# Patient Record
Sex: FEMALE | Race: WHITE | Employment: OTHER | URBAN - METROPOLITAN AREA
[De-identification: names, ages, dates, MRNs, and addresses within clinical notes are randomized per-mention and may not be internally consistent; named-entity substitution may affect disease eponyms.]

---

## 2017-01-03 ENCOUNTER — ALLSCRIPTS OFFICE VISIT (OUTPATIENT)
Dept: OTHER | Facility: OTHER | Age: 62
End: 2017-01-03

## 2017-03-17 ENCOUNTER — GENERIC CONVERSION - ENCOUNTER (OUTPATIENT)
Dept: OTHER | Facility: OTHER | Age: 62
End: 2017-03-17

## 2017-03-20 ENCOUNTER — ALLSCRIPTS OFFICE VISIT (OUTPATIENT)
Dept: OTHER | Facility: OTHER | Age: 62
End: 2017-03-20

## 2017-04-04 ENCOUNTER — ALLSCRIPTS OFFICE VISIT (OUTPATIENT)
Dept: OTHER | Facility: OTHER | Age: 62
End: 2017-04-04

## 2017-04-12 ENCOUNTER — GENERIC CONVERSION - ENCOUNTER (OUTPATIENT)
Dept: OTHER | Facility: OTHER | Age: 62
End: 2017-04-12

## 2017-06-29 ENCOUNTER — GENERIC CONVERSION - ENCOUNTER (OUTPATIENT)
Dept: OTHER | Facility: OTHER | Age: 62
End: 2017-06-29

## 2017-07-25 ENCOUNTER — ALLSCRIPTS OFFICE VISIT (OUTPATIENT)
Dept: OTHER | Facility: OTHER | Age: 62
End: 2017-07-25

## 2017-08-08 ENCOUNTER — GENERIC CONVERSION - ENCOUNTER (OUTPATIENT)
Dept: OTHER | Facility: OTHER | Age: 62
End: 2017-08-08

## 2017-09-25 ENCOUNTER — APPOINTMENT (OUTPATIENT)
Dept: LAB | Facility: HOSPITAL | Age: 62
End: 2017-09-25
Payer: OTHER GOVERNMENT

## 2017-09-25 ENCOUNTER — ALLSCRIPTS OFFICE VISIT (OUTPATIENT)
Dept: OTHER | Facility: OTHER | Age: 62
End: 2017-09-25

## 2017-09-25 DIAGNOSIS — E03.9 HYPOTHYROIDISM: ICD-10-CM

## 2017-09-25 LAB
ANION GAP SERPL CALCULATED.3IONS-SCNC: 7 MMOL/L (ref 4–13)
BASOPHILS # BLD AUTO: 0.03 THOUSANDS/ΜL (ref 0–0.1)
BASOPHILS NFR BLD AUTO: 1 % (ref 0–1)
BUN SERPL-MCNC: 19 MG/DL (ref 5–25)
CALCIUM SERPL-MCNC: 8.8 MG/DL (ref 8.3–10.1)
CHLORIDE SERPL-SCNC: 107 MMOL/L (ref 100–108)
CHOLEST SERPL-MCNC: 186 MG/DL (ref 50–200)
CO2 SERPL-SCNC: 27 MMOL/L (ref 21–32)
CREAT SERPL-MCNC: 0.91 MG/DL (ref 0.6–1.3)
EOSINOPHIL # BLD AUTO: 0.24 THOUSAND/ΜL (ref 0–0.61)
EOSINOPHIL NFR BLD AUTO: 5 % (ref 0–6)
ERYTHROCYTE [DISTWIDTH] IN BLOOD BY AUTOMATED COUNT: 13.8 % (ref 11.6–15.1)
GFR SERPL CREATININE-BSD FRML MDRD: 67 ML/MIN/1.73SQ M
GLUCOSE P FAST SERPL-MCNC: 74 MG/DL (ref 65–99)
HCT VFR BLD AUTO: 45.4 % (ref 34.8–46.1)
HDLC SERPL-MCNC: 71 MG/DL (ref 40–60)
HGB BLD-MCNC: 14.3 G/DL (ref 11.5–15.4)
LDLC SERPL CALC-MCNC: 99 MG/DL (ref 0–100)
LYMPHOCYTES # BLD AUTO: 1.78 THOUSANDS/ΜL (ref 0.6–4.47)
LYMPHOCYTES NFR BLD AUTO: 40 % (ref 14–44)
MCH RBC QN AUTO: 29.1 PG (ref 26.8–34.3)
MCHC RBC AUTO-ENTMCNC: 31.5 G/DL (ref 31.4–37.4)
MCV RBC AUTO: 93 FL (ref 82–98)
MONOCYTES # BLD AUTO: 0.52 THOUSAND/ΜL (ref 0.17–1.22)
MONOCYTES NFR BLD AUTO: 12 % (ref 4–12)
NEUTROPHILS # BLD AUTO: 1.92 THOUSANDS/ΜL (ref 1.85–7.62)
NEUTS SEG NFR BLD AUTO: 42 % (ref 43–75)
NRBC BLD AUTO-RTO: 0 /100 WBCS
PLATELET # BLD AUTO: 187 THOUSANDS/UL (ref 149–390)
PMV BLD AUTO: 11.6 FL (ref 8.9–12.7)
POTASSIUM SERPL-SCNC: 3.7 MMOL/L (ref 3.5–5.3)
RBC # BLD AUTO: 4.91 MILLION/UL (ref 3.81–5.12)
SODIUM SERPL-SCNC: 141 MMOL/L (ref 136–145)
TRIGL SERPL-MCNC: 78 MG/DL
TSH SERPL DL<=0.05 MIU/L-ACNC: 3.12 UIU/ML (ref 0.36–3.74)
WBC # BLD AUTO: 4.5 THOUSAND/UL (ref 4.31–10.16)

## 2017-09-25 PROCEDURE — 85025 COMPLETE CBC W/AUTO DIFF WBC: CPT

## 2017-09-25 PROCEDURE — 80048 BASIC METABOLIC PNL TOTAL CA: CPT

## 2017-09-25 PROCEDURE — 36415 COLL VENOUS BLD VENIPUNCTURE: CPT

## 2017-09-25 PROCEDURE — 80061 LIPID PANEL: CPT

## 2017-09-25 PROCEDURE — 84443 ASSAY THYROID STIM HORMONE: CPT

## 2017-09-26 ENCOUNTER — GENERIC CONVERSION - ENCOUNTER (OUTPATIENT)
Dept: OTHER | Facility: OTHER | Age: 62
End: 2017-09-26

## 2017-10-17 ENCOUNTER — ALLSCRIPTS OFFICE VISIT (OUTPATIENT)
Dept: OTHER | Facility: OTHER | Age: 62
End: 2017-10-17

## 2017-11-20 ENCOUNTER — GENERIC CONVERSION - ENCOUNTER (OUTPATIENT)
Dept: FAMILY MEDICINE CLINIC | Facility: CLINIC | Age: 62
End: 2017-11-20

## 2018-01-09 NOTE — RESULT NOTES
Verified Results  (1) BASIC METABOLIC PROFILE [id number] 08:14PM Kelsea Kessler Order Number: [id number]     Test Name Result Flag Reference   SODIUM 141 mmol/L  136-145   POTASSIUM 3 7 mmol/L  3 5-5 3   CHLORIDE 107 mmol/L  100-108   CARBON DIOXIDE 27 mmol/L  21-32   ANION GAP (CALC) 7 mmol/L  4-13   BLOOD UREA NITROGEN 19 mg/dL  5-25   CREATININE 0 91 mg/dL  0 60-1 30   Standardized to IDMS reference method   CALCIUM 8 8 mg/dL  8 3-10 1   eGFR 67 ml/min/1 73sq m     National Kidney Disease Education Program recommendations are as follows:  GFR calculation is accurate only with a steady state creatinine  Chronic Kidney disease less than 60 ml/min/1 73 sq  meters  Kidney failure less than 15 ml/min/1 73 sq  meters  GLUCOSE FASTING 74 mg/dL  65-99   Specimen collection should occur prior to Sulfasalazine administration due to the potential for falsely depressed results  Specimen collection should occur prior to Sulfapyridine administration due to the potential for falsely elevated results

## 2018-01-09 NOTE — PSYCH
Psych Med Mgmt    Appearance: was calm and cooperative, adequate hygiene and grooming and good eye contact  Observed mood: depressed and anxious  Observed mood: affect was broad and affect was tearful   reports frequent tearfulness at home  Speech: pressured  Thought processes: coherent/organized  Hallucinations: no hallucinations present  Thought Content: no delusions  Orientation: The patient is oriented to person, place and time  Recent and Remote Memory: short term memory intact and long term memory intact  Attention Span And Concentration: concentration intact  Insight: Insight intact  Judgment: Her judgment was intact  Muscle Strength And Tone  Muscle strength and tone were normal  Normal gait and station  The patient is experiencing no localized pain  Treatment Recommendations: The patient agrees to take Parnate 30 mg twice daily  She will also be taking aripiprazole 5 mg daily for the purpose of augmentation  Risks, Benefits And Possible Side Effects Of Medications: Risks, benefits, and possible side effects of medications explained to patient and patient verbalizes understanding  She reports normal appetite, normal energy level and no weight change  feels depressed and at times tearful  feels lonely  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 and are not available to her  Her  tries to be supportive She has a number of worries about her grand daughter not eating after an episode of choking  she has been taking Parnate 60 mg daily as a single dose  She started seeing her psychotherapist last week  Assessment    1  Major depression, recurrent (296 30) (F33 9)    Plan    1  ClonazePAM 0 5 MG Oral Tablet; 1/2 tab b i d and one whole tab at bedtime    2  ARIPiprazole 5 MG Oral Tablet; One a day   3   Tranylcypromine Sulfate 10 MG Oral Tablet; 3 TABS TWICE A DAY    Review of Systems    Constitutional: No fever, no chills, feels well, no tiredness, no recent weight gain or loss  Cardiovascular: no complaints of slow or fast heart rate, no chest pain, no palpitations  Respiratory: no complaints of shortness of breath, no wheezing, no dyspnea on exertion  Gastrointestinal: no complaints of abdominal pain, no constipation, no nausea, no diarrhea, no vomiting  Genitourinary: no complaints of dysuria, no incontinence, no pelvic pain, no urinary frequency  Musculoskeletal: no complaints of arthralgia, no myalgias, no limb pain, no joint stiffness  Integumentary: no complaints of skin rash, no itching, no dry skin  Neurological: no complaints of headache, no confusion, no numbness, no dizziness  Active Problems    1  Anxiety disorder (300 00) (F41 9)   2  Major depression, recurrent (296 30) (F33 9)    Allergies    1  Percocet TABS    Current Meds   1  ClonazePAM 0 5 MG Oral Tablet; 1/2 tab b i d and one whole tab at bedtime; Therapy: 86GXO1074 to (Last Rx:41Crn2568) Ordered   2  Tranylcypromine Sulfate 10 MG Oral Tablet; 2 tabs t i d;   Therapy: 48Dlw2664 to (Last QJ:61IOO6346)  Requested for: 65PFX9846 Ordered    Family Psych History    1  Family history of Depression, recurrent    2  Family history of Anxiety   3  Family history of Depression, recurrent    Social History    · Never a smoker    End of Encounter Meds    1  ClonazePAM 0 5 MG Oral Tablet; 1/2 tab b i d and one whole tab at bedtime; Therapy: 77DGW5962 to (Last Rx:85Sgg4343) Ordered    2  ARIPiprazole 5 MG Oral Tablet; One a day; Therapy: 48GTE0168 to (Last Rx:20Ylk1991)  Requested for: 72CKS4047 Ordered   3  Tranylcypromine Sulfate 10 MG Oral Tablet; 3 TABS TWICE A DAY;    Therapy: 58NVS7808 to (Last Rx:25Upx3443)  Requested for: 81VIG5448 Ordered    Future Appointments    Date/Time Provider Specialty Site   03/29/2016 04:30 PM Zoë Fraga MD Psychiatry ST [address] [place] [address]     Signatures   Electronically signed by : Liam Landry MD; Feb 8 2016  4:12PM EST (Author)

## 2018-01-09 NOTE — PSYCH
Psych Med Mgmt    Appearance: was calm and cooperative, adequate hygiene and grooming and good eye contact  Observed mood: affect was broad  Speech: a normal rate  Thought processes: coherent/organized  Hallucinations: no hallucinations present  Thought Content: no delusions  Orientation: The patient is oriented to person, place and time  Recent and Remote Memory: short term memory intact and long term memory intact  Attention Span And Concentration: concentration intact  Insight: Insight intact  Judgment: Her judgment was intact  Muscle Strength And Tone  Muscle strength and tone were normal  Normal gait and station  The patient is experiencing no localized pain  Treatment Recommendations: Continue with same meds and return to the office in 3 month  Risks, Benefits And Possible Side Effects Of Medications: Risks, benefits, and possible side effects of medications explained to patient and patient verbalizes understanding  She reports normal energy level, no weight change and normal number of sleep hours  The patient was seen for continuing care and pharmacotherapy  After taking Abilify for a few days she developed restlessness and anxiety which sounds like akathisia  She stopped taking it and has been feeling better  She was also taking clonazepam 0 5 mg at bedtime only and this started taking 0 25 during the day which helped with her mood  Her granddaughter is doing better and all in all she hasn't stabilized  Assessment    1  Recurrent major depressive disorder (296 30) (F33 9)   2  Anxiety disorder (300 00) (F41 9)    Plan    1  ClonazePAM 0 5 MG Oral Tablet; 1/2 tab b i d and one whole tab at bedtime    2  Tranylcypromine Sulfate 10 MG Oral Tablet; 3 TABS TWICE A DAY    Review of Systems    Constitutional: No fever, no chills, feels well, no tiredness, no recent weight gain or loss     Cardiovascular: no complaints of slow or fast heart rate, no chest pain, no palpitations  Respiratory: no complaints of shortness of breath, no wheezing, no dyspnea on exertion  Gastrointestinal: no complaints of abdominal pain, no constipation, no nausea, no diarrhea, no vomiting  Genitourinary: no complaints of dysuria, no incontinence, no pelvic pain, no urinary frequency  Musculoskeletal: no complaints of arthralgia, no myalgias, no limb pain, no joint stiffness  Integumentary: no complaints of skin rash, no itching, no dry skin  Neurological: no complaints of headache, no confusion, no numbness, no dizziness  Active Problems    1  Anxiety disorder (300 00) (F41 9)   2  Recurrent major depressive disorder (296 30) (F33 9)    Allergies    1  Percocet TABS    Current Meds   1  ClonazePAM 0 5 MG Oral Tablet; 1/2 tab b i d and one whole tab at bedtime; Therapy: 00EIG5508 to (Last Rx:46Kbf1557) Ordered   2  Tranylcypromine Sulfate 10 MG Oral Tablet; 3 TABS TWICE A DAY; Therapy: 61XHE9882 to (Last Rx:08Feb2016)  Requested for: 32YEH8812 Ordered    Family Psych History    1  Family history of Depression, recurrent    2  Family history of Anxiety   3  Family history of Depression, recurrent    Social History    · Never a smoker    End of Encounter Meds    1  ClonazePAM 0 5 MG Oral Tablet; 1/2 tab b i d and one whole tab at bedtime; Therapy: 44WMX1090 to (Last Rx:29Mar2016) Ordered    2  Tranylcypromine Sulfate 10 MG Oral Tablet; 3 TABS TWICE A DAY;    Therapy: 53Cna6518 to (Last Rx:29Mar2016)  Requested for: 11NMI5787 Ordered    Signatures   Electronically signed by : Lilliana Corona MD; Mar 29 2016  4:47PM EST                       (Author)

## 2018-01-10 NOTE — PSYCH
Psych Med Mgmt    Appearance: was calm and cooperative  Observed mood: mood appropriate  Observed mood: affect appropriate  Speech: a normal rate  Thought processes: coherent/organized  Hallucinations: no hallucinations present  Thought Content: no delusions  Abnormal Thoughts: The patient has no suicidal thoughts and no homicidal thoughts  Orientation: The patient is oriented to person, place and time  Recent and Remote Memory: short term memory intact  Attention Span And Concentration: concentration intact  Insight: Insight intact  Judgment: Her judgment was intact  Treatment Recommendations: Follow up in 3 months  She reports normal appetite, normal energy level, no weight change and normal number of sleep hours  Patient reports that she is doing well and remains stable on her medications  She occasionally becomes anxious, but has been using yoga and positive self-encouragement to calm herself down  Episodes of depression have been minimal, and have not been of overwhelming concern to her  She is physically active, 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  She is sleeping well, at least 7-9 hours per night, and has not needed to take Klonopin every night to fall asleep  She is eating well  No concerns at this time  Vitals  Signs   Recorded: 54MYE6766 07:26IF   Systolic: 280  Diastolic: 65  Height: 5 ft 4 in  Weight: 147 lb   BMI Calculated: 25 23  BSA Calculated: 1 72    Assessment    1  Anxiety disorder (300 00) (F41 9)   2  Recurrent major depressive disorder (296 30) (F33 9)    Plan    1  ClonazePAM 0 5 MG Oral Tablet; one half a tablet at bedtime    2  From  Tranylcypromine Sulfate 10 MG Oral Tablet 3 TABS TWICE A DAY To   Tranylcypromine Sulfate 10 MG Oral Tablet Take 2 tablets 3 times per day    Review of Systems    Constitutional: No fever, no chills, feels well, no tiredness, no recent weight gain or loss     Cardiovascular: no complaints of slow or fast heart rate, no chest pain, no palpitations  Respiratory: no complaints of shortness of breath, no wheezing, no dyspnea on exertion  Gastrointestinal: no complaints of abdominal pain, no constipation, no nausea, no diarrhea, no vomiting  Genitourinary: no complaints of dysuria, no incontinence, no pelvic pain, no urinary frequency  Musculoskeletal: no complaints of arthralgia, no myalgias, no limb pain, no joint stiffness  Integumentary: no complaints of skin rash, no itching, no dry skin  Neurological: no complaints of headache, no confusion, no numbness, no dizziness  Active Problems    1  Anxiety disorder (300 00) (F41 9)   2  Recurrent major depressive disorder (296 30) (F33 9)    Allergies    1  Percocet TABS    Current Meds   1  ClonazePAM 0 5 MG Oral Tablet; one half a tablet at bedtime; Therapy: 26XVI8610 to (Last Rx:95Pqo9429) Ordered   2  Tranylcypromine Sulfate 10 MG Oral Tablet; 3 TABS TWICE A DAY; Therapy: 70ZMI6885 to (Last Elston Mohs)  Requested for: 57Epf8590 Ordered    Family Psych History  Mother    1  Family history of Depression, recurrent  Father    2  Family history of Anxiety   3  Family history of Depression, recurrent    Social History    · Never a smoker    End of Encounter Meds    1  ClonazePAM 0 5 MG Oral Tablet; one half a tablet at bedtime; Therapy: 24VDN8707 to (Last Rx:89Fft7417) Ordered    2  Tranylcypromine Sulfate 10 MG Oral Tablet; Take 2 tablets 3 times per day;    Therapy: 48Yne2115 to (Last VB:83XWF7251)  Requested for: 57FYP1980 Ordered    Signatures   Electronically signed by : Helene Yap, [facility]; [place]  3 2017  3:01PM EST                       (Author)    Electronically signed by : Liam Landry MD; [place]  3 2017  4:09PM EST

## 2018-01-12 VITALS
BODY MASS INDEX: 25.1 KG/M2 | HEIGHT: 64 IN | SYSTOLIC BLOOD PRESSURE: 96 MMHG | WEIGHT: 147 LBS | DIASTOLIC BLOOD PRESSURE: 62 MMHG

## 2018-01-12 NOTE — PSYCH
Psych Med Mgmt    Appearance: was calm and cooperative  Observed mood: mood appropriate  Observed mood: affect appropriate  Speech: a normal rate  Thought processes: coherent/organized  Hallucinations: no hallucinations present  Thought Content: no delusions  Abnormal Thoughts: The patient has no suicidal thoughts  Orientation: The patient is oriented to person, place and time  Recent and Remote Memory: short term memory intact and long term memory intact  Attention Span And Concentration: concentration intact  Insight: Insight intact  Judgment: Her judgment was intact  Treatment Recommendations: followup in 3 months  She reports normal appetite, normal energy level, no weight change and normal number of sleep hours  Patient reports that she remains stable on her medications  She denies depression and states that she has not felt anxious very much between now and her last appointment  She is only needing to take the Clonazepam at night once in awhile for sleep, but states that she tries to find other ways of putting herself to sleep before using the medication (such as journaling)  She tried cutting back on her Parnate from 60 mg to 50 mg for 2 weeks hoping that she would be able to maintain a good mood, but noticed herself becoming irritable more easily  She resumed with her prescribed dose  She is eating and sleeping well, and still stays active by teaching yoga  Assessment    1  Recurrent major depressive disorder (296 30) (F33 9)   2  Anxiety disorder (300 00) (F41 9)    Plan    1  ClonazePAM 0 5 MG Oral Tablet; one half a tablet at bedtime    Review of Systems    Constitutional: No fever, no chills, feels well, no tiredness, no recent weight gain or loss  Cardiovascular: no complaints of slow or fast heart rate, no chest pain, no palpitations  Respiratory: no complaints of shortness of breath, no wheezing, no dyspnea on exertion     Gastrointestinal: no complaints of abdominal pain, no constipation, no nausea, no diarrhea, no vomiting  Genitourinary: no complaints of dysuria, no incontinence, no pelvic pain, no urinary frequency  Musculoskeletal: no complaints of arthralgia, no myalgias, no limb pain, no joint stiffness  Integumentary: no complaints of skin rash, no itching, no dry skin  Neurological: no complaints of headache, no confusion, no numbness, no dizziness  Active Problems    1  Accelerated hypertension (401 0) (I10)   2  Anxiety disorder (300 00) (F41 9)   3  Hypothyroidism (244 9) (E03 9)   4  Medication reaction (995 20) (T88 7XXA)   5  Menopause (627 2) (Z78 0)   6  Recurrent major depressive disorder (296 30) (F33 9)    Past Medical History    1  History of screening mammography (V15 89) (Z92 89)    Allergies    1  Percocet TABS    Current Meds   1  ClonazePAM 0 5 MG Oral Tablet; one half a tablet at bedtime; Therapy: 58QDH6320 to (Last Rx:03Jan2017) Ordered   2  Estradiol 0 5 MG Oral Tablet; Therapy: (Recorded:20Mar2017) to Recorded   3  Fish Oil CAPS; Therapy: (Recorded:20Mar2017) to Recorded   4  Glucosamine Chondroitin Joint Oral Tablet; Therapy: (Recorded:20Mar2017) to Recorded   5  Levothyroxine Sodium 75 MCG CAPS; Therapy: (Recorded:20Mar2017) to Recorded   6  Probiotic Oral Capsule; Therapy: (Recorded:20Mar2017) to Recorded   7  Tranylcypromine Sulfate 10 MG Oral Tablet; Take 2 tablets 3 times per day; Therapy: 40OAW2930 to (Last Rx:04Apr2017)  Requested for: 04Apr2017 Ordered   8  Vitamin D-3 1000 UNIT Oral Capsule; Therapy: (Recorded:20Mar2017) to Recorded    Family Psych History  Mother    1  Family history of Depression, recurrent  Father    2  Family history of Anxiety   3  Family history of Depression, recurrent    Social History    ·    · Never a smoker   · Primary spoken language English   · Racial background    End of Encounter Meds    1   ClonazePAM 0 5 MG Oral Tablet; one half a tablet at bedtime; Therapy: 77GJB9184 to (Last Rx:69Soy5232) Ordered    2  Fish Oil CAPS; Therapy: (Recorded:20Mar2017) to Recorded   3  Glucosamine Chondroitin Joint Oral Tablet; Therapy: (Recorded:20Mar2017) to Recorded   4  Probiotic Oral Capsule; Therapy: (Recorded:20Mar2017) to Recorded   5  Vitamin D-3 1000 UNIT Oral Capsule; Therapy: (Recorded:20Mar2017) to Recorded    6  Levothyroxine Sodium 75 MCG CAPS; Therapy: (Recorded:20Mar2017) to Recorded    7  Estradiol 0 5 MG Oral Tablet; Therapy: (Recorded:20Mar2017) to Recorded    8  Tranylcypromine Sulfate 10 MG Oral Tablet; Take 2 tablets 3 times per day;    Therapy: 77LBU8774 to (Last Rx:04Apr2017)  Requested for: 04Apr2017 Ordered    Signatures   Electronically signed by : Enmanuel Abbott, [facility]; Jul 25 2017  2:51PM EST                       (Author)    Electronically signed by : Kassi Win MD; Jul 25 2017  2:52PM EST

## 2018-01-12 NOTE — PSYCH
Psych Med Mgmt    Appearance: was calm and cooperative  Observed mood: mood appropriate  Observed mood: affect appropriate  Speech: a normal rate  Thought processes: coherent/organized  Hallucinations: no hallucinations present  Thought Content: no delusions  Abnormal Thoughts: The patient has no suicidal thoughts  Orientation: The patient is oriented to person, place and time  Recent and Remote Memory: short term memory intact and long term memory intact  Attention Span And Concentration: concentration intact  Insight: Insight intact  Judgment: Her judgment was intact  Treatment Recommendations: continue on current medications  discussed with patient the importance of adhering to the dietary restrictions for parnate  She reports normal appetite, normal energy level, no weight change and decrease in number of sleep hours some nights she sleeps better than other nights   Patient reports that she has been stable on her medications  She was recently hospitalized after eating a farmer's cheese, which is contraindicated with Parnate  She was in [facility] for increased blood pressure and memory loss  Today, she is wondering whether it is in her best interest to stay on the medication or switch to another one  She was encouraged to stay on the medication since she has been doing well on it, but to continue to be vigilant of the food that she is eating  She was agreeable to this  She denies depression and anxiety  Her sleep pattern is inconsistent, but she is able to concentrate and function during the day  She is eating well, and continues to stay active teaching yoga  Vitals  Signs   Recorded: 93REQ8352 03:05PM   Heart Rate: 88  Systolic: 98  Diastolic: 62    Assessment    1  Anxiety disorder (300 00) (F41 9)   2  Recurrent major depressive disorder (296 30) (F33 9)    Plan    1  Tranylcypromine Sulfate 10 MG Oral Tablet;  Take 2 tablets 3 times per day    Review of Systems    Constitutional: No fever, no chills, feels well, no tiredness, no recent weight gain or loss  Cardiovascular: no complaints of slow or fast heart rate, no chest pain, no palpitations  Respiratory: no complaints of shortness of breath, no wheezing, no dyspnea on exertion  Gastrointestinal: no complaints of abdominal pain, no constipation, no nausea, no diarrhea, no vomiting  Genitourinary: no complaints of dysuria, no incontinence, no pelvic pain, no urinary frequency  Musculoskeletal: no complaints of arthralgia, no myalgias, no limb pain, no joint stiffness  Integumentary: no complaints of skin rash, no itching, no dry skin  Neurological: no complaints of headache, no confusion, no numbness, no dizziness  Active Problems    1  Accelerated hypertension (401 0) (I10)   2  Anxiety disorder (300 00) (F41 9)   3  Hypothyroidism (244 9) (E03 9)   4  Medication reaction (995 20) (T88 7XXA)   5  Menopause (627 2) (Z78 0)   6  Recurrent major depressive disorder (296 30) (F33 9)    Allergies    1  Percocet TABS    Current Meds   1  ClonazePAM 0 5 MG Oral Tablet; one half a tablet at bedtime; Therapy: 05PTA9178 to (Last Rx:03Jan2017) Ordered   2  Estradiol 0 5 MG Oral Tablet; Therapy: (Recorded:20Mar2017) to Recorded   3  Fish Oil CAPS; Therapy: (Recorded:20Mar2017) to Recorded   4  Glucosamine Chondroitin Joint Oral Tablet; Therapy: (Recorded:20Mar2017) to Recorded   5  Levothyroxine Sodium 75 MCG CAPS; Therapy: (Recorded:20Mar2017) to Recorded   6  Probiotic Oral Capsule; Therapy: (Recorded:20Mar2017) to Recorded   7  Tranylcypromine Sulfate 10 MG Oral Tablet; Take 2 tablets 3 times per day; Therapy: 79YJS9055 to (Last Rx:03Jan2017)  Requested for: 60HNM2873 Ordered   8  Vitamin D-3 1000 UNIT Oral Capsule; Therapy: (Recorded:20Mar2017) to Recorded    Family Psych History  Mother    1  Family history of Depression, recurrent  Father    2  Family history of Anxiety   3   Family history of Depression, recurrent    Social History    · Never a smoker    End of Encounter Meds    1  ClonazePAM 0 5 MG Oral Tablet; one half a tablet at bedtime; Therapy: 77NEU3564 to (Last Rx:03Jan2017) Ordered    2  Fish Oil CAPS; Therapy: (Recorded:20Mar2017) to Recorded   3  Glucosamine Chondroitin Joint Oral Tablet; Therapy: (Recorded:20Mar2017) to Recorded   4  Probiotic Oral Capsule; Therapy: (Recorded:20Mar2017) to Recorded   5  Vitamin D-3 1000 UNIT Oral Capsule; Therapy: (Recorded:20Mar2017) to Recorded    6  Levothyroxine Sodium 75 MCG CAPS; Therapy: (Recorded:20Mar2017) to Recorded    7  Estradiol 0 5 MG Oral Tablet; Therapy: (Recorded:20Mar2017) to Recorded    8  Tranylcypromine Sulfate 10 MG Oral Tablet; Take 2 tablets 3 times per day; Therapy: 77LMI8976 to (Last Rx:04Apr2017)  Requested for: 04Apr2017 Ordered    Signatures   Electronically signed by : AIME Noonan;  Apr 4 2017  3:06PM EST                       (Author)    Electronically signed by : Alie Villarreal MD; Apr 5 2017  4:50PM EST

## 2018-01-14 VITALS
SYSTOLIC BLOOD PRESSURE: 101 MMHG | BODY MASS INDEX: 25.1 KG/M2 | DIASTOLIC BLOOD PRESSURE: 65 MMHG | HEIGHT: 64 IN | WEIGHT: 147 LBS

## 2018-01-14 VITALS — DIASTOLIC BLOOD PRESSURE: 62 MMHG | HEART RATE: 88 BPM | SYSTOLIC BLOOD PRESSURE: 98 MMHG

## 2018-01-14 VITALS — DIASTOLIC BLOOD PRESSURE: 60 MMHG | HEIGHT: 64 IN | SYSTOLIC BLOOD PRESSURE: 80 MMHG

## 2018-01-14 NOTE — PSYCH
Psych Med Mgmt    Appearance: was calm and cooperative, adequate hygiene and grooming and good eye contact  Observed mood: euthymic  Observed mood: affect was broad, but affect appropriate  Speech: a normal rate  Thought processes: coherent/organized  Hallucinations: no hallucinations present  Thought Content: no delusions  Abnormal Thoughts: The patient has no suicidal thoughts and no homicidal thoughts  Orientation: The patient is oriented to person, place and time  Recent and Remote Memory: short term memory intact and long term memory intact  Insight: Insight intact  Judgment: Her judgment was intact  Muscle Strength And Tone  Muscle strength and tone were normal  Normal gait and station  The patient is experiencing no localized pain  Treatment Recommendations: continue with same dose of Parnate and clonazepam  RTO in 4 months  She reports normal appetite, normal energy level, no weight change and normal number of sleep hours  seen for recurrent depression  she has cut back on clonazepam and is now taking only half a tablet at bedtime  Normal sleep pattern and appetite  Socially and physically active  No problem with dietary restrictions  No health problems  Vitals  Signs   Recorded: [id number] 32:70DF   Systolic: 500, Standing  Diastolic: 67, Standing  Heart Rate: 80  Recorded: [id number] 66:02IG   Systolic: 493, Sitting  Diastolic: 64, Sitting  Heart Rate: 76    Assessment    1  Anxiety disorder (300 00) (F41 9)   2  Recurrent major depressive disorder (296 30) (F33 9)    Plan    1  From  ClonazePAM 0 5 MG Oral Tablet 1/2 tab b i d and one whole tab at   bedtime To ClonazePAM 0 5 MG Oral Tablet one half a tablet at bedtime    2  Tranylcypromine Sulfate 10 MG Oral Tablet; 3 TABS TWICE A DAY    Review of Systems    Constitutional: No fever, no chills, feels well, no tiredness, no recent weight gain or loss     Cardiovascular: no complaints of slow or fast heart rate, no chest pain, no palpitations  Respiratory: no complaints of shortness of breath, no wheezing, no dyspnea on exertion  Gastrointestinal: no complaints of abdominal pain, no constipation, no nausea, no diarrhea, no vomiting  Genitourinary: no complaints of dysuria, no incontinence, no pelvic pain, no urinary frequency  Active Problems    1  Anxiety disorder (300 00) (F41 9)   2  Recurrent major depressive disorder (296 30) (F33 9)    Allergies    1  Percocet TABS    Current Meds   1  ClonazePAM 0 5 MG Oral Tablet; 1/2 tab b i d and one whole tab at bedtime; Therapy: [id number] to (Last Rx:29Mar2016) Ordered   2  Tranylcypromine Sulfate 10 MG Oral Tablet; 3 TABS TWICE A DAY; Therapy: 13OOJ0753 to (Last Bridger Juarez)  Requested for: 20Jun2016 Ordered    Family Psych History  Mother    1  Family history of Depression, recurrent  Father    2  Family history of Anxiety   3  Family history of Depression, recurrent    Social History    · Never a smoker    End of Encounter Meds    1  ClonazePAM 0 5 MG Oral Tablet; one half a tablet at bedtime; Therapy: 15HXQ2192 to (Last Rx:[id number]) Ordered    2  Tranylcypromine Sulfate 10 MG Oral Tablet; 3 TABS TWICE A DAY;    Therapy: [id number] to (Last Nurys Call)  Requested for: [id number] Ordered    Signatures   Electronically signed by : Vivian Hollingsworth MD; Sep 28 2016  4:35PM EST                       (Author)

## 2018-01-15 ENCOUNTER — ALLSCRIPTS OFFICE VISIT (OUTPATIENT)
Dept: OTHER | Facility: OTHER | Age: 63
End: 2018-01-15

## 2018-01-15 NOTE — MISCELLANEOUS
Message  The patient had called stating that medication was not working yet   I returned her call and left a message on her voicemail      Signatures   Electronically signed by : Kory Pretty MD; Feb 18 2016  2:21PM EST                       (Author)

## 2018-01-16 NOTE — PSYCH
Psych Med Mgmt    Appearance: good eye contact  Observed mood: mood appropriate  Observed mood: affect appropriate  Speech: a normal rate  Thought processes: coherent/organized  Hallucinations: no hallucinations present  Thought Content: no delusions  Abnormal Thoughts: The patient has no suicidal thoughts  Orientation: The patient is oriented to person, place and time  Recent and Remote Memory: short term memory intact and long term memory intact  Attention Span And Concentration: concentration intact  Insight: Insight intact  Judgment: Her judgment was intact  Treatment Recommendations: continue with current medications  She reports normal appetite, normal energy level, no weight change and normal number of sleep hours  Patient states that she continues to do well on her medications  She rarely needs to take the Klonopin for anxiety  She utilizes coping skills to deal with stress and anxiety and finds that they help to reduce negativity  She has been enjoying her job and 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  Occasional difficulty sleeping, but she focuses on improving her hours of restfulness via sleep hygiene  Assessment    1  Recurrent major depressive disorder (296 30) (F33 9)   2  Anxiety disorder (300 00) (F41 9)    Plan    1  ClonazePAM 0 5 MG Oral Tablet; one half a tablet at bedtime    2  Tranylcypromine Sulfate 10 MG Oral Tablet; Take 2 tablets 3 times per day    Review of Systems    Constitutional: No fever, no chills, feels well, no tiredness, no recent weight gain or loss  Cardiovascular: no complaints of slow or fast heart rate, no chest pain, no palpitations  Respiratory: no complaints of shortness of breath, no wheezing, no dyspnea on exertion  Gastrointestinal: no complaints of abdominal pain, no constipation, no nausea, no diarrhea, no vomiting     Genitourinary: no complaints of dysuria, no incontinence, no pelvic pain, no urinary frequency  Musculoskeletal: no complaints of arthralgia, no myalgias, no limb pain, no joint stiffness  Integumentary: no complaints of skin rash, no itching, no dry skin  Neurological: no complaints of headache, no confusion, no numbness, no dizziness  Active Problems    1  Anxiety disorder (300 00) (F41 9)   2  Hypothyroidism (244 9) (E03 9)   3  Menopause (627 2) (Z78 0)   4  Recurrent major depressive disorder (296 30) (F33 9)   5  Screening for colon cancer (V76 51) (Z12 11)   6  Varicose veins of legs (454 9) (I83 93)    Past Medical History    1  History of adverse drug reaction (V14 9) (Z88 9)   2  History of malignant hypertension (V12 59) (Z86 79)   3  History of screening mammography (V15 89) (Z92 89)    Allergies    1  Percocet TABS    Current Meds   1  ClonazePAM 0 5 MG Oral Tablet; one half a tablet at bedtime; Therapy: [id number] to (Last Rx:[id number]) Ordered   2  Estradiol 0 5 MG Oral Tablet; Therapy: (Recorded:20Mar2017) to Recorded   3  Fish Oil CAPS; Therapy: (Recorded:20Mar2017) to Recorded   4  Glucosamine Chondroitin Joint Oral Tablet; Therapy: (Recorded:20Mar2017) to Recorded   5  Levothyroxine Sodium 75 MCG CAPS; Therapy: (Recorded:20Mar2017) to Recorded   6  Probiotic Oral Capsule; Therapy: (Recorded:20Mar2017) to Recorded   7  Tranylcypromine Sulfate 10 MG Oral Tablet; Take 2 tablets 3 times per day; Therapy: [id number] to (Last Rx:04Apr2017)  Requested for: 04Apr2017 Ordered   8  Vitamin D-3 1000 UNIT Oral Capsule; Therapy: (Recorded:20Mar2017) to Recorded    Family Psych History  Mother    1  Family history of Depression, recurrent   2  Family history of pancreatic cancer (V16 0) (Z80 0)  Father    3  Family history of Anxiety   4   Family history of Depression, recurrent    Social History    · Currently works full-time (V49 89) (Z78 9)   ·    · Never a smoker   · Primary spoken language English   · Racial background    End of Encounter Meds    1  ClonazePAM 0 5 MG Oral Tablet; one half a tablet at bedtime; Therapy: 19JHE4261 to (Last Rx:17Oct2017) Ordered    2  Fish Oil CAPS; Therapy: (Recorded:20Mar2017) to Recorded   3  Glucosamine Chondroitin Joint Oral Tablet; Therapy: (Recorded:20Mar2017) to Recorded   4  Probiotic Oral Capsule; Therapy: (Recorded:20Mar2017) to Recorded   5  Vitamin D-3 1000 UNIT Oral Capsule; Therapy: (Recorded:20Mar2017) to Recorded    6  Levothyroxine Sodium 75 MCG CAPS; Therapy: (Recorded:20Mar2017) to Recorded    7  Estradiol 0 5 MG Oral Tablet; Therapy: (Recorded:20Mar2017) to Recorded    8  Tranylcypromine Sulfate 10 MG Oral Tablet; Take 2 tablets 3 times per day; Therapy: 13Mfx6784 to (Last Rx:17Oct2017)  Requested for: 17Oct2017 Ordered    Future Appointments    Date/Time Provider Specialty Site   03/26/2018 09:30 AM DIANE Casarez   [address] [place] [address]     Signatures   Electronically signed by : Miguel Adams; Oct 17 2017  3:21PM EST                       (Author)    Electronically signed by : Gianni Hall MD; Oct 23 2017  8:05AM EST

## 2018-01-17 NOTE — MISCELLANEOUS
Message  Patient was in the hospital this past weekend (March 11-12) due to interaction between Parnate and aged cheese  Patient reports that this is not the first time she has eaten this food, but in the past has had no reaction  This time, patient reports that her blood pressure immediately reyna-rocketed and her thinking/vision became blurred  She was taken to the ER and received a CT scan that was negative for stroke-like activity  Reports that troponin levels were elevated on cardiac assessment  Patient was told to contact her psychiatrist- patient wants to continue the medication because it has been working for her in the past  She acknowledges that she knew the risk of eating aged foods with MAOIs, but ate it at her own risk        Signatures   Electronically signed by : Billy Molina, [facility]; Mar 17 2017  3:48PM EST                       (Author)

## 2018-01-18 NOTE — PSYCH
Psych Med Mgmt    Appearance: was calm and cooperative  Observed mood: euthymic  Observed mood: affect was broad  Speech: a normal rate  Thought processes: coherent/organized  Thought Content: no delusions  Abnormal Thoughts: The patient has no suicidal thoughts and no homicidal thoughts  Orientation: The patient is oriented to person, place and time  Recent and Remote Memory: short term memory intact and long term memory intact  Attention Span And Concentration: concentration intact  Insight: Insight intact  Judgment: Her judgment was intact  Muscle Strength And Tone  Muscle strength and tone were normal  Normal gait and station  The patient is experiencing no localized pain  Treatment Recommendations: Adequate hydration and sufficient salt intake  Avoid rising quickly  Risks, Benefits And Possible Side Effects Of Medications: Risks, benefits, and possible side effects of medications explained to patient and patient verbalizes understanding  She reports normal appetite, normal energy level, no weight change and normal number of sleep hours  The patient's was seen for follow-up of anxiety disorder  She seems to be doing very well and she is no longer taking clonazepam except on an as-needed basis  Her only side effect is occasional lightheadedness when she stands up too quickly which is consistent with mild orthostasis  She keeps herself active  Vitals  Signs [Data Includes: Current Encounter]   Recorded: 20Jun2016 04:10PM   Heart Rate: 77  Systolic: 93, Standing  Diastolic: 57, Standing  Heart Rate: 73  Systolic: 082, Sitting  Diastolic: 64, Sitting    Assessment    1  Recurrent major depressive disorder (296 30) (F33 9)    Plan    1  Tranylcypromine Sulfate 10 MG Oral Tablet; 3 TABS TWICE A DAY    Review of Systems    Cardiovascular: postural Lightheadedness occasionally, but no complaints of slow or fast heart rate, no chest pain, no palpitations     Respiratory: no complaints of shortness of breath, no wheezing, no dyspnea on exertion  Gastrointestinal: no complaints of abdominal pain, no constipation, no nausea, no diarrhea, no vomiting  Genitourinary: no complaints of dysuria, no incontinence, no pelvic pain, no urinary frequency  Musculoskeletal: no complaints of arthralgia, no myalgias, no limb pain, no joint stiffness  Active Problems    1  Anxiety disorder (300 00) (F41 9)   2  Recurrent major depressive disorder (296 30) (F33 9)    Allergies    1  Percocet TABS    Current Meds   1  ClonazePAM 0 5 MG Oral Tablet; 1/2 tab b i d and one whole tab at bedtime; Therapy: [id number] to (Last Rx:29Mar2016) Ordered   2  Tranylcypromine Sulfate 10 MG Oral Tablet; 3 TABS TWICE A DAY; Therapy: 97GNF5062 to (Last Rx:29Mar2016)  Requested for: 29Mar2016 Ordered    Family Psych History  Mother    1  Family history of Depression, recurrent  Father    2  Family history of Anxiety   3  Family history of Depression, recurrent    Social History    · Never a smoker    End of Encounter Meds    1  ClonazePAM 0 5 MG Oral Tablet; 1/2 tab b i d and one whole tab at bedtime; Therapy: 10INW5551 to (Last Rx:29Mar2016) Ordered    2  Tranylcypromine Sulfate 10 MG Oral Tablet; 3 TABS TWICE A DAY;    Therapy: 35Gsd8643 to (Last Dayana Lapping)  Requested for: 20Jun2016 Ordered    Signatures   Electronically signed by : Fabienne Zamorano MD; Jun 20 2016  4:12PM EST                       (Author)

## 2018-01-23 NOTE — PSYCH
Psych Med Mgmt    Appearance: was calm and cooperative, adequate hygiene and grooming and good eye contact  Observed mood: euthymic  Observed mood: affect was broad, but affect appropriate  Speech: a normal rate and fluent  Thought processes: coherent/organized  Hallucinations: no hallucinations present  Thought Content: no delusions  Abnormal Thoughts: The patient has no suicidal thoughts and no homicidal thoughts  Orientation: The patient is oriented to person, place and time  Recent and Remote Memory: short term memory intact and long term memory intact  Attention Span And Concentration: concentration intact  Insight: Insight intact  Judgment: Her judgment was intact  Treatment Recommendations: Because of insurance reasons, she cannot have follow-up treatment at our office  I referred her to Dr Suki Ayala  My suggestion was that she start reducing Parnate by 10 mg every several months    Risks, Benefits And Possible Side Effects Of Medications: Risks, benefits, and possible side effects of medications explained to patient and patient verbalizes understanding  She reports normal appetite, normal energy level, no weight change and normal number of sleep hours  seen for anxiety and depression  She remains in remission  No new health problems  She has been compliant with MAOI diet  Last March she misread the label on a cheese pack thinking it was not aged/processed and ate some  She had an episode of hypertensive crisis with encephalopathy, hospitalized for a couple of days  Assessment    1  Recurrent major depressive disorder (296 30) (F33 9)   2  Anxiety disorder (300 00) (F41 9)    Review of Systems    Constitutional: No fever, no chills, feels well, no tiredness, no recent weight gain or loss  Cardiovascular: no complaints of slow or fast heart rate, no chest pain, no palpitations     Respiratory: no complaints of shortness of breath, no wheezing, no dyspnea on exertion  Gastrointestinal: no complaints of abdominal pain, no constipation, no nausea, no diarrhea, no vomiting  Genitourinary: no complaints of dysuria, no incontinence, no pelvic pain, no urinary frequency  Musculoskeletal: no complaints of arthralgia, no myalgias, no limb pain, no joint stiffness  Integumentary: no complaints of skin rash, no itching, no dry skin  Neurological: no complaints of headache, no confusion, no numbness, no dizziness  Active Problems    1  Anxiety disorder (300 00) (F41 9)   2  Hypothyroidism (244 9) (E03 9)   3  Menopause (627 2) (Z78 0)   4  Recurrent major depressive disorder (296 30) (F33 9)   5  Screening for colon cancer (V76 51) (Z12 11)   6  Varicose veins of legs (454 9) (I83 93)    Past Medical History    1  History of adverse drug reaction (V14 9) (Z88 9)   2  History of malignant hypertension (V12 59) (Z86 79)   3  History of screening mammography (V15 89) (Z92 89)    Allergies    1  Percocet TABS    Current Meds   1  ClonazePAM 0 5 MG Oral Tablet; one half a tablet at bedtime; Therapy: [id number] to (Last Rx:17Oct2017) Ordered   2  Estradiol 0 5 MG Oral Tablet; Therapy: (Recorded:20Mar2017) to Recorded   3  Levothyroxine Sodium 75 MCG CAPS; Therapy: (Recorded:20Mar2017) to Recorded   4  Tranylcypromine Sulfate 10 MG Oral Tablet; Take 2 tablets 3 times per day; Therapy: 94SYE2669 to (Last Rx:17Oct2017)  Requested for: 17Oct2017 Ordered    Family Psych History  Mother    1  Family history of Depression, recurrent   2  Family history of pancreatic cancer (V16 0) (Z80 0)  Father    3  Family history of Anxiety   4  Family history of Depression, recurrent    Social History    · Currently works full-time (V49 89) (Z78 9)   ·    · Never a smoker   · Primary spoken language English   · Racial background    End of Encounter Meds    1  ClonazePAM 0 5 MG Oral Tablet; one half a tablet at bedtime; Therapy: 35UHF9487 to (Last Rx:17Oct2017) Ordered    2  Levothyroxine Sodium 75 MCG CAPS; Therapy: (Recorded:20Mar2017) to Recorded    3  Estradiol 0 5 MG Oral Tablet; Therapy: (Recorded:20Mar2017) to Recorded    4  Tranylcypromine Sulfate 10 MG Oral Tablet; Take 2 tablets 3 times per day;    Therapy: 88Qan8430 to (Last Rx:17Oct2017)  Requested for: 17Oct2017 Ordered    Future Appointments    Date/Time Provider Specialty Site   03/29/2018 09:30 AM Anu Flores, [facility] Twyla Pizano      Signatures   Electronically signed by : Mey Coffey MD; Juliocesar 15 2018  3:34PM EST                       (Author)

## 2018-03-05 DIAGNOSIS — Z79.890 HORMONE REPLACEMENT THERAPY (POSTMENOPAUSAL): Primary | ICD-10-CM

## 2018-03-05 RX ORDER — ESTRADIOL 0.5 MG/1
1 TABLET ORAL DAILY
COMMUNITY
End: 2018-03-05 | Stop reason: SDUPTHER

## 2018-03-06 RX ORDER — ESTRADIOL 0.5 MG/1
0.5 TABLET ORAL DAILY
Qty: 90 TABLET | Refills: 1 | Status: SHIPPED | OUTPATIENT
Start: 2018-03-06 | End: 2018-09-02 | Stop reason: SDUPTHER

## 2018-03-29 ENCOUNTER — OFFICE VISIT (OUTPATIENT)
Dept: FAMILY MEDICINE CLINIC | Facility: CLINIC | Age: 63
End: 2018-03-29
Payer: OTHER GOVERNMENT

## 2018-03-29 VITALS
SYSTOLIC BLOOD PRESSURE: 108 MMHG | HEART RATE: 86 BPM | HEIGHT: 65 IN | OXYGEN SATURATION: 99 % | BODY MASS INDEX: 24.66 KG/M2 | WEIGHT: 148 LBS | DIASTOLIC BLOOD PRESSURE: 64 MMHG

## 2018-03-29 DIAGNOSIS — Z78.0 MENOPAUSE: ICD-10-CM

## 2018-03-29 DIAGNOSIS — E03.9 HYPOTHYROIDISM, UNSPECIFIED TYPE: Primary | ICD-10-CM

## 2018-03-29 DIAGNOSIS — F33.41 RECURRENT MAJOR DEPRESSIVE DISORDER, IN PARTIAL REMISSION (HCC): ICD-10-CM

## 2018-03-29 DIAGNOSIS — F41.1 GENERALIZED ANXIETY DISORDER: ICD-10-CM

## 2018-03-29 PROBLEM — I83.93 VARICOSE VEINS OF LEGS: Status: ACTIVE | Noted: 2017-09-25

## 2018-03-29 PROBLEM — H81.10 BPPV (BENIGN PAROXYSMAL POSITIONAL VERTIGO): Status: ACTIVE | Noted: 2018-03-01

## 2018-03-29 LAB
T3FREE SERPL-MCNC: 2.85 PG/ML (ref 2.3–4.2)
T4 FREE SERPL-MCNC: 1.27 NG/DL (ref 0.76–1.46)
TSH SERPL DL<=0.05 MIU/L-ACNC: 3.54 UIU/ML

## 2018-03-29 PROCEDURE — 84443 ASSAY THYROID STIM HORMONE: CPT | Performed by: PHYSICIAN ASSISTANT

## 2018-03-29 PROCEDURE — 36415 COLL VENOUS BLD VENIPUNCTURE: CPT | Performed by: PHYSICIAN ASSISTANT

## 2018-03-29 PROCEDURE — 99214 OFFICE O/P EST MOD 30 MIN: CPT | Performed by: PHYSICIAN ASSISTANT

## 2018-03-29 PROCEDURE — 84439 ASSAY OF FREE THYROXINE: CPT | Performed by: PHYSICIAN ASSISTANT

## 2018-03-29 PROCEDURE — 84481 FREE ASSAY (FT-3): CPT | Performed by: PHYSICIAN ASSISTANT

## 2018-03-29 RX ORDER — TRANYLCYPROMINE 10 MG/1
5 TABLET ORAL 3 TIMES DAILY
COMMUNITY
Start: 2015-08-05 | End: 2018-04-17 | Stop reason: SDUPTHER

## 2018-03-29 RX ORDER — GLUCOSAM/MSM/CHOND/HYALURON AC 750-60-150
1 TABLET ORAL DAILY
COMMUNITY
End: 2018-10-01 | Stop reason: ALTCHOICE

## 2018-03-29 RX ORDER — LEVOTHYROXINE SODIUM 75 UG/1
1 CAPSULE ORAL DAILY
COMMUNITY
End: 2019-03-04 | Stop reason: SDUPTHER

## 2018-03-29 RX ORDER — CLONAZEPAM 0.5 MG/1
0.5 TABLET ORAL
COMMUNITY
Start: 2015-08-05 | End: 2018-04-17 | Stop reason: SDUPTHER

## 2018-03-29 NOTE — ASSESSMENT & PLAN NOTE
Patient has tried to wean off of estradiol but experienced severe hot flushes  Aware of risks   Continue therapy

## 2018-03-29 NOTE — PROGRESS NOTES
Assessment/Plan:    Hypothyroidism  Pt feels like she is more tired and intolerant to cold, last TSH was normal  TSH, T3, T4 ordered    Anxiety disorder  Doing well on Parnate and prn clonazepam  Cont psych f/u    Menopause  Patient has tried to wean off of estradiol but experienced severe hot flushes  Aware of risks  Continue therapy    [facility]  Well controlled on parnate, trying to wean off under guidance of psych  Diagnoses and all orders for this visit:    Hypothyroidism, unspecified type  -     TSH baseline  -     T3, free  -     T4, free    Generalized anxiety disorder    Menopause    Recurrent major depressive disorder, in partial remission ([facility] )    Other orders  -     clonazePAM (KlonoPIN) 0 5 mg tablet; Take 0 5 tablets by mouth daily at bedtime as needed for insomnia  -     Omega-3 Fatty Acids (FISH OIL) 645 MG CAPS; Take 1 capsule by mouth daily  -     Glucos-Chondroit-Hyaluron-MSM (GLUCOSAMINE CHONDROITIN JOINT) TABS; Take 1 tablet by mouth daily  -     Levothyroxine Sodium 75 MCG CAPS; Take 1 capsule by mouth daily  -     Lactobacillus (PROBIOTIC ACIDOPHILUS PO); Take 1 tablet by mouth daily  -     tranylcypromine (PARNATE) 10 mg tablet; Take 5 tablets by mouth 3 (three) times a day    -     cholecalciferol (VITAMIN D3) 1,000 units tablet; Take 5 tablets by mouth daily    -     CALCIUM PO; Take 2 tablets by mouth daily  -     Multiple Vitamins-Minerals (MULTIVITAMIN ADULT PO); Take 1 tablet by mouth daily          Subjective:      Patient ID: Benoit Car is a 58 y o  female  59 y/o menopausal F hx anxiety, depression, hypothyroidism presents for f/u  She would like more thorough thyroid testing because she feels she is experiencing breakthrough fatigue and cold intolerance  TSH in September was WNL  Currently on levothyroxine 75 mcg  She takes 5000 mg Vit D 2/2 her nutritionists suggestion   No Vit D level drawn prior  Psych follows pt's depression and anxiety  Currently on parnate with goal of decreasing dose under psych supervision due to good control  Prnclonazepam for breakthrough anxiety  Reports sx are well controlled  No SI, anhedonia, trouble concentrating, appetite change  Sometimes needs clonazepam to sleep  Pt is a   Pt takes estradiol for menopausal hot flashes, failed wean off        The following portions of the patient's history were reviewed and updated as appropriate: allergies, current medications, past family history, past medical history, past social history, past surgical history and problem list     Review of Systems   Constitutional: Positive for fatigue  Negative for diaphoresis and fever  HENT: Negative for congestion, ear pain, hearing loss, postnasal drip, rhinorrhea and sore throat  Eyes: Negative for pain, redness and visual disturbance  Respiratory: Negative for cough, shortness of breath and wheezing  Cardiovascular: Negative for chest pain, palpitations and leg swelling  Gastrointestinal: Negative for anal bleeding, constipation, diarrhea, nausea and vomiting  Endocrine: Positive for cold intolerance  Negative for polydipsia and polyuria  Genitourinary: Negative for dysuria, flank pain and hematuria  Musculoskeletal: Negative for arthralgias, back pain, joint swelling and myalgias  Skin: Negative for pallor, rash and wound  Neurological: Negative for dizziness, syncope, numbness and headaches  Hematological: Negative for adenopathy  Does not bruise/bleed easily  Psychiatric/Behavioral: Negative for decreased concentration, dysphoric mood, sleep disturbance and suicidal ideas  The patient is not nervous/anxious  Objective:      /64 (BP Location: Left arm, Patient Position: Sitting, Cuff Size: Standard)   Pulse 86   Ht 5' 5" (1 651 m)   Wt 67 1 kg (148 lb)   SpO2 99%   BMI 24 63 kg/m²          Physical Exam   Constitutional: She is oriented to person, place, and time   She appears well-developed and well-nourished  No distress  HENT:   Head: Normocephalic and atraumatic  Mouth/Throat: Oropharynx is clear and moist    Eyes: Conjunctivae and EOM are normal  Pupils are equal, round, and reactive to light  Right eye exhibits no discharge  Left eye exhibits no discharge  No scleral icterus  Neck: Normal range of motion  Neck supple  No thyromegaly present  Cardiovascular: Normal rate, regular rhythm and normal heart sounds  Exam reveals no gallop and no friction rub  No murmur heard  Pulmonary/Chest: Effort normal and breath sounds normal  No respiratory distress  She has no wheezes  She has no rales  Abdominal: Soft  She exhibits no distension and no mass  There is no tenderness  There is no rebound and no guarding  Musculoskeletal: Normal range of motion  She exhibits no edema  Lymphadenopathy:     She has no cervical adenopathy  Neurological: She is alert and oriented to person, place, and time  No cranial nerve deficit  Skin: Skin is warm and dry  No rash noted  She is not diaphoretic  No erythema  No pallor

## 2018-04-02 ENCOUNTER — TELEPHONE (OUTPATIENT)
Dept: FAMILY MEDICINE CLINIC | Facility: CLINIC | Age: 63
End: 2018-04-02

## 2018-04-02 NOTE — TELEPHONE ENCOUNTER
I provided normal lab results to patient    Scott   ----- Message from Alexandro Hood PA-C sent at 3/29/2018  4:24 PM EDT -----  Please tell patient TSH, T3 and T4 were all normal

## 2018-04-17 ENCOUNTER — OFFICE VISIT (OUTPATIENT)
Dept: PSYCHIATRY | Facility: CLINIC | Age: 63
End: 2018-04-17
Payer: OTHER GOVERNMENT

## 2018-04-17 DIAGNOSIS — F33.40 RECURRENT MAJOR DEPRESSIVE DISORDER, IN REMISSION (HCC): Primary | ICD-10-CM

## 2018-04-17 PROCEDURE — 99213 OFFICE O/P EST LOW 20 MIN: CPT | Performed by: PSYCHIATRY & NEUROLOGY

## 2018-04-17 RX ORDER — CLONAZEPAM 0.5 MG/1
0.25 TABLET ORAL
Qty: 180 TABLET | Refills: 1 | Status: SHIPPED | OUTPATIENT
Start: 2018-04-17 | End: 2018-08-30 | Stop reason: SDUPTHER

## 2018-04-17 RX ORDER — TRANYLCYPROMINE 10 MG/1
50 TABLET ORAL 3 TIMES DAILY
Qty: 450 TABLET | Refills: 1 | Status: SHIPPED | OUTPATIENT
Start: 2018-04-17 | End: 2018-08-30 | Stop reason: SDUPTHER

## 2018-04-17 NOTE — PSYCH
Patient ID: Mer Najera is a 58 y o  female  HPI ROS Appetite Changes and Sleep: normal appetite, normal energy level, no weight change and normal number of sleep hours    Review Of Systems:     Mood Normal   Thought Content Normal   General Normal    Gastrointestinal Normal   Neurological Normal        Laboratory Results: No results found for this or any previous visit  Subjective:      She is doing good  She has been taking Parnate 50 mg daily without any changes in her mood  She is interested in continued dose reduction  Periods of anxiety are brief and short lived    Objective:   Remains in remission    Mental status:  Appearance calm and cooperative , adequate hygiene and grooming and good eye contact    Mood euthymic   Affect affect was broad and affect appropriate    Speech a normal rate   Thought Processes coherent/organized and normal thought processes   Hallucinations no hallucinations present    Thought Content no delusional thoughts verbalized   Abnormal Thoughts no suicidal thoughts    Orientation A+O x 3   Remote Memory short term memory intact and long term memory intact   Attention Span concentration intact   Intellect Not Formally Assessed   Insight Insight intact   Judgement judgment was intact   Muscle Strength Muscle strength and tone were normal and Normal gait    Language no difficulty naming common objects   Pain none       Assessment/Plan:       Diagnoses and all orders for this visit:    [facility]  -     tranylcypromine (PARNATE) 10 mg tablet; Take 5 tablets (50 mg total) by mouth 3 (three) times a day Two in the morning and afternoon+ one at night  -     clonazePAM (KlonoPIN) 0 5 mg tablet;  Take 0 5 tablets (0 25 mg total) by mouth daily at bedtime as needed for anxiety            Treatment Recommendations- Risks Benefits    Return to office in 3 months  Risks, Benefits And Possible Side Effects Of Medications:  Risks, benefits, and possible side effects of medications explained to patient and patient verbalizes understanding

## 2018-08-30 ENCOUNTER — OFFICE VISIT (OUTPATIENT)
Dept: PSYCHIATRY | Facility: CLINIC | Age: 63
End: 2018-08-30
Payer: OTHER GOVERNMENT

## 2018-08-30 DIAGNOSIS — F33.40 RECURRENT MAJOR DEPRESSIVE DISORDER, IN REMISSION (HCC): ICD-10-CM

## 2018-08-30 DIAGNOSIS — F33.42 RECURRENT MAJOR DEPRESSIVE DISORDER, IN FULL REMISSION (HCC): Primary | ICD-10-CM

## 2018-08-30 PROCEDURE — 99213 OFFICE O/P EST LOW 20 MIN: CPT | Performed by: PSYCHIATRY & NEUROLOGY

## 2018-08-30 RX ORDER — CLONAZEPAM 0.5 MG/1
0.25 TABLET ORAL
Qty: 180 TABLET | Refills: 0
Start: 2018-08-30 | End: 2018-11-15 | Stop reason: SDUPTHER

## 2018-08-30 RX ORDER — TRANYLCYPROMINE 10 MG/1
50 TABLET ORAL 3 TIMES DAILY
Qty: 450 TABLET | Refills: 0
Start: 2018-08-30 | End: 2018-11-15

## 2018-08-30 NOTE — PSYCH
Patient ID: Jason Anderson is a 58 y o  female  HPI ROS Appetite Changes and Sleep: normal appetite, normal energy level, no weight change and normal number of sleep hours    Review Of Systems:     Mood Normal   Thought Content Normal   General Normal    Gastrointestinal Normal   Neurological Normal        Laboratory Results: No results found for this or any previous visit  Subjective:    Seen for recurrent depression  She has been taking Parnate 40 mg daily for several months without any effects  Generally sleeping and eating well- compliant with dietary restrictions  Objective:     Remains in remission  Mental status:  Appearance calm and cooperative , adequate hygiene and grooming and good eye contact    Mood euthymic   Affect affect was broad and affect appropriate    Speech Normal rate and Normal volume   Thought Processes coherent/organized   Hallucinations no hallucinations present    Thought Content no delusional thoughts verbalized   Abnormal Thoughts no suicidal thoughts  and no homicidal thoughts    Orientation A+O x 3   Memory function Not tested   Attention Span concentration intact   Intellect Not Formally Assessed   Insight Insight intact   Judgement judgment was intact   Muscle Strength Muscle strength and tone were normal and Normal gait    Language no difficulty naming common objects   Pain none       Assessment/Plan:       Diagnoses and all orders for this visit:    Recurrent major depressive disorder, [facility]            Treatment Recommendations- Risks Benefits    We will reduce Parnate at next visit    Risks, Benefits And Possible Side Effects Of Medications:  Risks, benefits, and possible side effects of medications explained to patient and patient verbalizes understanding

## 2018-09-02 DIAGNOSIS — Z79.890 HORMONE REPLACEMENT THERAPY (POSTMENOPAUSAL): ICD-10-CM

## 2018-09-04 RX ORDER — ESTRADIOL 0.5 MG/1
TABLET ORAL
Qty: 90 TABLET | Refills: 1 | Status: SHIPPED | OUTPATIENT
Start: 2018-09-04 | End: 2019-03-04 | Stop reason: SDUPTHER

## 2018-10-01 ENCOUNTER — OFFICE VISIT (OUTPATIENT)
Dept: FAMILY MEDICINE CLINIC | Facility: CLINIC | Age: 63
End: 2018-10-01
Payer: OTHER GOVERNMENT

## 2018-10-01 VITALS
HEIGHT: 65 IN | HEART RATE: 85 BPM | BODY MASS INDEX: 23.99 KG/M2 | OXYGEN SATURATION: 98 % | RESPIRATION RATE: 18 BRPM | SYSTOLIC BLOOD PRESSURE: 104 MMHG | TEMPERATURE: 97.8 F | DIASTOLIC BLOOD PRESSURE: 60 MMHG | WEIGHT: 144 LBS

## 2018-10-01 DIAGNOSIS — Z78.0 MENOPAUSE: ICD-10-CM

## 2018-10-01 DIAGNOSIS — Z23 NEEDS FLU SHOT: ICD-10-CM

## 2018-10-01 DIAGNOSIS — F33.42 RECURRENT MAJOR DEPRESSIVE DISORDER, IN FULL REMISSION (HCC): ICD-10-CM

## 2018-10-01 DIAGNOSIS — E03.9 HYPOTHYROIDISM, UNSPECIFIED TYPE: Primary | ICD-10-CM

## 2018-10-01 PROBLEM — H81.10 BPPV (BENIGN PAROXYSMAL POSITIONAL VERTIGO): Status: RESOLVED | Noted: 2018-03-01 | Resolved: 2018-10-01

## 2018-10-01 PROBLEM — M18.9 OSTEOARTHRITIS OF CARPOMETACARPAL (CMC) JOINT OF THUMB: Status: ACTIVE | Noted: 2018-03-15

## 2018-10-01 LAB — TSH SERPL DL<=0.05 MIU/L-ACNC: 2.28 UIU/ML (ref 0.36–3.74)

## 2018-10-01 PROCEDURE — 84443 ASSAY THYROID STIM HORMONE: CPT | Performed by: PHYSICIAN ASSISTANT

## 2018-10-01 PROCEDURE — 90471 IMMUNIZATION ADMIN: CPT | Performed by: PHYSICIAN ASSISTANT

## 2018-10-01 PROCEDURE — 36415 COLL VENOUS BLD VENIPUNCTURE: CPT | Performed by: PHYSICIAN ASSISTANT

## 2018-10-01 PROCEDURE — 99214 OFFICE O/P EST MOD 30 MIN: CPT | Performed by: PHYSICIAN ASSISTANT

## 2018-10-01 PROCEDURE — 90682 RIV4 VACC RECOMBINANT DNA IM: CPT | Performed by: PHYSICIAN ASSISTANT

## 2018-10-01 NOTE — ASSESSMENT & PLAN NOTE
Well controlled on estradiol, difficulty with weaning in the past  Reviewed risks and sx of stroke, cad, dvt and the need to go to ER if sx occur   She reciprocates understanding

## 2018-10-01 NOTE — PROGRESS NOTES
Assessment/Plan:    Hypothyroidism  TSH drawn today  STable on levothyroxine 75 mcg for years  No sx    Menopause  Well controlled on estradiol, difficulty with weaning in the past  Reviewed risks and sx of stroke, cad, dvt and the need to go to ER if sx occur  She reciprocates understanding    Recurrent major depressive disorder ([facility] )  Pt has been doing well while weaning off of parnate under close psych f/u  She should continue to f/u with them    Needs flu shot  flublok given today       Diagnoses and all orders for this visit:    Hypothyroidism, unspecified type  -     TSH, 3rd generation with Free T4 reflex    Recurrent major depressive disorder, in full remission ([facility] )    Menopause    Needs flu shot  -     influenza vaccine, 4092-0305, quadrivalent, recombinant, PF, 0 5 mL, for patients 18 yr+ (FLUBLOK)          Subjective:      Patient ID: Almita Rascon is a 58 y o  female  28-year-old female with history of hypothyroidism, depression, symptomatic menopause presents for follow-up chronic conditions today  No complaints  Hypothyroidism has been stable for many years on levothyroxine 75 mcg daily  Denies fatigue, weight changes, change in mood, change in bowel habits  TSH 6 months ago as well as T3 and T4 were within normal limits  Depression has been managed by psych  She has been trying to wean off of Parnate through close follow-up with them and has been doing very well with the adjustments  She stays very active as a part-time   She feels that this has significantly helped with her mood  Reports eating a healthy diet and exercising regularly  Patient takes estradiol daily for menopause  She experience severe hot flashes when tried to wean off in the past   She is aware of the risk of clots  No history of stroke, coronary disease, hyperlipidemia or hypertension  Screening labs performed last year were all well within normal limits    Up-to-date on mammogram   She has not yet had colonoscopy but has the number at home and will make an appointment to have this performed  The following portions of the patient's history were reviewed and updated as appropriate: allergies, current medications, past family history, past medical history, past social history, past surgical history and problem list     Review of Systems   Constitutional: Negative for diaphoresis, fatigue and fever  HENT: Negative for congestion, ear pain, hearing loss, postnasal drip, rhinorrhea and sore throat  Eyes: Negative for pain, redness and visual disturbance  Respiratory: Negative for cough, shortness of breath and wheezing  Cardiovascular: Negative for chest pain, palpitations and leg swelling  Gastrointestinal: Negative for anal bleeding, constipation, diarrhea, nausea and vomiting  Endocrine: Negative for polydipsia and polyuria  Genitourinary: Negative for dysuria, flank pain and hematuria  Musculoskeletal: Negative for arthralgias, back pain, joint swelling and myalgias  Skin: Negative for pallor, rash and wound  Neurological: Negative for dizziness, syncope, numbness and headaches  Hematological: Negative for adenopathy  Does not bruise/bleed easily  Psychiatric/Behavioral: Negative for dysphoric mood, sleep disturbance and suicidal ideas  The patient is not nervous/anxious  Objective:      /60 (BP Location: Left arm, Patient Position: Sitting, Cuff Size: Adult)   Pulse 85   Temp 97 8 °F (36 6 °C) (Tympanic)   Resp 18   Ht 5' 5" (1 651 m)   Wt 65 3 kg (144 lb)   SpO2 98%   BMI 23 96 kg/m²          Physical Exam   Constitutional: She is oriented to person, place, and time  She appears well-developed and well-nourished  No distress  HENT:   Head: Normocephalic and atraumatic  Mouth/Throat: Oropharynx is clear and moist    Eyes: Pupils are equal, round, and reactive to light  Conjunctivae and EOM are normal  Right eye exhibits no discharge   Left eye exhibits no discharge  No scleral icterus  Neck: Normal range of motion  Neck supple  No thyromegaly present  Cardiovascular: Normal rate, regular rhythm and normal heart sounds  Exam reveals no gallop and no friction rub  No murmur heard  Pulmonary/Chest: Effort normal and breath sounds normal  No respiratory distress  She has no wheezes  She has no rales  Abdominal: Soft  She exhibits no distension and no mass  There is no tenderness  There is no rebound and no guarding  Musculoskeletal: Normal range of motion  She exhibits no edema  Lymphadenopathy:     She has no cervical adenopathy  Neurological: She is alert and oriented to person, place, and time  No cranial nerve deficit  Skin: Skin is warm and dry  No rash noted  She is not diaphoretic  No erythema  No pallor

## 2018-10-01 NOTE — ASSESSMENT & PLAN NOTE
Pt has been doing well while weaning off of parnate under close psych f/u   She should continue to f/u with them

## 2018-10-02 ENCOUNTER — TELEPHONE (OUTPATIENT)
Dept: FAMILY MEDICINE CLINIC | Facility: CLINIC | Age: 63
End: 2018-10-02

## 2018-10-02 NOTE — TELEPHONE ENCOUNTER
----- Message from Jan Easton PA-C sent at 10/2/2018  8:03 AM EDT -----  Please tell pt labs were normal

## 2018-10-14 DIAGNOSIS — F33.40 RECURRENT MAJOR DEPRESSIVE DISORDER, IN REMISSION (HCC): ICD-10-CM

## 2018-10-23 RX ORDER — TRANYLCYPROMINE 10 MG/1
TABLET ORAL
Qty: 450 TABLET | Refills: 1 | Status: SHIPPED | OUTPATIENT
Start: 2018-10-23 | End: 2018-11-15 | Stop reason: SDUPTHER

## 2018-11-15 ENCOUNTER — OFFICE VISIT (OUTPATIENT)
Dept: PSYCHIATRY | Facility: CLINIC | Age: 63
End: 2018-11-15
Payer: OTHER GOVERNMENT

## 2018-11-15 VITALS — RESPIRATION RATE: 16 BRPM | HEIGHT: 65 IN | BODY MASS INDEX: 23.99 KG/M2 | WEIGHT: 144 LBS

## 2018-11-15 DIAGNOSIS — F33.40 RECURRENT MAJOR DEPRESSIVE DISORDER, IN REMISSION (HCC): Primary | ICD-10-CM

## 2018-11-15 DIAGNOSIS — F41.1 GENERALIZED ANXIETY DISORDER: ICD-10-CM

## 2018-11-15 PROCEDURE — 99214 OFFICE O/P EST MOD 30 MIN: CPT | Performed by: PHYSICIAN ASSISTANT

## 2018-11-15 RX ORDER — TRANYLCYPROMINE 10 MG/1
TABLET ORAL
Qty: 360 TABLET | Refills: 0
Start: 2018-11-15 | End: 2019-04-18 | Stop reason: SDUPTHER

## 2018-11-15 RX ORDER — CLONAZEPAM 0.5 MG/1
0.25 TABLET ORAL
Qty: 90 TABLET | Refills: 1 | Status: SHIPPED | OUTPATIENT
Start: 2018-11-15 | End: 2019-02-14 | Stop reason: SDUPTHER

## 2018-11-15 NOTE — PSYCH
PROGRESS NOTE        [facility]      Name and Date of Birth:  Regina Pacheco 58 y o  1955    Date of Visit: 11/15/18    SUBJECTIVE:  Pt seen for follow up  She states she has been doing well  Handling things quite well  Her youngest daughter got  recently  She has been handling things better and dealing with change better  She is sleeping well at night and taking half a tablet of a 0 5 mg clonazepam   She is doing well with the reduction the Parnate to 40 mg total per day  She has been on Parnate for over 27 years so is agreeable to continue with very slow titration at this time will continue with 40 mg daily  She reports good motivation and interest   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  No new medical issues or problems  She denies suicidal ideation, intent or plan at present, has no suicidal ideation, intent or plan at present  She denies any auditory hallucinations and visual hallucinations, denies any other delusional thinking, denies any delusional thinking  She denies any side effects from medications    HPI ROS Appetite Changes and Sleep: normal appetite, normal sleep    Review Of Systems:      Constitutional Negative   ENT Negative   Cardiovascular Negative   Respiratory Negative   Gastrointestinal Negative   Genitourinary Negative   Musculoskeletal Negative   Integumentary Negative   Neurological Negative   Endocrine Negative   Other Symptoms Negative and None       Laboratory Results: No results found for this or any previous visit      Substance Abuse History:    History   Drug Use No       Family Psychiatric History:     Family History   Problem Relation Age of Onset    Pancreatic cancer Mother     Depression Mother         Recurrent    Depression Father         Recurrent    Other Father         Anxiety       The following portions of the patient's history were reviewed and updated as appropriate: past family history, past medical history, past social history, past surgical history and problem list     Social History     Social History    Marital status: /Civil Union     Spouse name: N/A    Number of children: N/A    Years of education: N/A     Occupational History          Currently works full-time     Social History Main Topics    Smoking status: Never Smoker    Smokeless tobacco: Never Used    Alcohol use No    Drug use: No    Sexual activity: Not on file     Other Topics Concern    Not on file     Social History Narrative    No narrative on file     Social History     Social History Narrative    No narrative on file        Social History     Tobacco History     Smoking Status  Never Smoker    Smokeless Tobacco Use  Never Used          Alcohol History     Alcohol Use Status  No          Drug Use     Drug Use Status  No          Sexual Activity     Sexually Active  Not Asked          Activities of Daily Living    Not Asked                     OBJECTIVE:     Mental Status Evaluation:    Appearance age appropriate, casually dressed   Behavior pleasant, cooperative   Speech normal volume, normal pitch somewhat rambling   Mood Euthymic   Affect Congruent   Thought Processes Circumstantial   Associations intact associations   Thought Content normal   Perceptual Disturbances: none   Abnormal Thoughts  Risk Potential Suicidal ideation - None  Homicidal ideation - None  Potential for aggression - No   Orientation oriented to person, place, time/date and situation   Memory recent and remote memory grossly intact   Cosciousness alert and awake   Attention Span attention span and concentration are age appropriate   Intellect Appears to be of Average Intelligence   Insight age appropriate    Judgement good    Muscle Strength and  Gait muscle strength and tone were normal   Language no difficulty naming common objects   Fund of Knowledge displays adequate knowledge of current events Pain none   Pain Scale 0       Assessment/Plan:       Diagnoses and all orders for this visit:    Recurrent major depressive disorder, in remission ([facility] )  -     tranylcypromine (PARNATE) 10 mg tablet; 2 po bid  -     clonazePAM (KlonoPIN) 0 5 mg tablet; Take 0 5 tablets (0 25 mg total) by mouth daily at bedtime as needed for anxiety    Generalized anxiety disorder          Treatment Recommendations/Precautions:  Parnate 10 mg  2 po bid  Klonopin 0 5 mg 1/2 po qhs prn    Continue current medications    Risks/Benefits      Risks, Benefits And Possible Side Effects Of Medications:    Risks, benefits, and possible side effects of medications explained to patient and patient verbalizes understanding and agreement for treatment      Controlled Medication Discussion:     Taking as prescribed    Psychotherapy Provided:     Individual psychotherapy provided: No

## 2019-02-14 ENCOUNTER — OFFICE VISIT (OUTPATIENT)
Dept: PSYCHIATRY | Facility: CLINIC | Age: 64
End: 2019-02-14
Payer: MEDICARE

## 2019-02-14 VITALS — RESPIRATION RATE: 16 BRPM | HEIGHT: 65 IN | BODY MASS INDEX: 23.96 KG/M2

## 2019-02-14 DIAGNOSIS — F41.1 GENERALIZED ANXIETY DISORDER: ICD-10-CM

## 2019-02-14 DIAGNOSIS — F33.42 RECURRENT MAJOR DEPRESSIVE DISORDER, IN FULL REMISSION (HCC): Primary | ICD-10-CM

## 2019-02-14 DIAGNOSIS — F33.40 RECURRENT MAJOR DEPRESSIVE DISORDER, IN REMISSION (HCC): ICD-10-CM

## 2019-02-14 PROCEDURE — 99214 OFFICE O/P EST MOD 30 MIN: CPT | Performed by: PHYSICIAN ASSISTANT

## 2019-02-14 RX ORDER — CLONAZEPAM 0.5 MG/1
0.25 TABLET ORAL
Qty: 90 TABLET | Refills: 1 | Status: SHIPPED | OUTPATIENT
Start: 2019-02-14 | End: 2019-07-03 | Stop reason: SDUPTHER

## 2019-02-14 RX ORDER — LANOLIN ALCOHOL/MO/W.PET/CERES
1 CREAM (GRAM) TOPICAL 3 TIMES DAILY
COMMUNITY

## 2019-02-14 RX ORDER — MAGNESIUM 30 MG
30 TABLET ORAL 2 TIMES DAILY
COMMUNITY

## 2019-02-14 NOTE — PSYCH
PROGRESS NOTE        [facility]      Name and Date of Birth:  Nadege Stephenson 71 y o  1955    Date of Visit: 02/14/19    SUBJECTIVE:  Patient seen for follow-up  Pt states she has some increase in depression and anxiety last month  She was worries about her depression returning but that is has been stabilizing  States the depression lasted about a week and a half and has been improving  States she looked at past journals a noted that end of January is typically a tough time of year for her  She was relying on her spouse and has been communicating more with him which has been helpful  Has been working out regularly and feels better  Also continues to teach yoga  She is going to [place] next week and looking forward to that  She denies suicidal ideation, intent or plan at present, has no suicidal ideation, intent or plan at present  She denies any auditory hallucinations and visual hallucinations, denies any other delusional thinking, denies any delusional thinking  She denies any side effects from medications    HPI ROS Appetite Changes and Sleep: normal appetite, normal sleep    Review Of Systems:      Constitutional Negative   ENT Negative   Cardiovascular Negative   Respiratory Negative   Gastrointestinal Negative   Genitourinary Negative   Musculoskeletal Negative   Integumentary Negative   Neurological Negative   Endocrine Negative   Other Symptoms Negative and None       Laboratory Results: No results found for this or any previous visit      Substance Abuse History:    Social History     Substance and Sexual Activity   Drug Use No       Family Psychiatric History:     Family History   Problem Relation Age of Onset    Pancreatic cancer Mother     Depression Mother         Recurrent    Depression Father         Recurrent    Other Father         Anxiety       The following portions of the patient's history were reviewed and updated as appropriate: past family history, past medical history, past social history, past surgical history and problem list     Social History     Socioeconomic History    Marital status: /Civil Union     Spouse name: Not on file    Number of children: Not on file    Years of education: Not on file    Highest education level: Not on file   Occupational History     Comment: Currently works full-time   Social Needs    Financial resource strain: Not on file    Food insecurity:     Worry: Not on file     Inability: Not on file   Brayola needs:     Medical: Not on file     Non-medical: Not on file   Tobacco Use    Smoking status: Never Smoker    Smokeless tobacco: Never Used   Substance and Sexual Activity    Alcohol use: No    Drug use: No    Sexual activity: Not on file   Lifestyle    Physical activity:     Days per week: Not on file     Minutes per session: Not on file    Stress: Not on file   Relationships    Social connections:     Talks on phone: Not on file     Gets together: Not on file     Attends Restorationism service: Not on file     Active member of club or organization: Not on file     Attends meetings of clubs or organizations: Not on file     Relationship status: Not on file    Intimate partner violence:     Fear of current or ex partner: Not on file     Emotionally abused: Not on file     Physically abused: Not on file     Forced sexual activity: Not on file   Other Topics Concern    Not on file   Social History Narrative    Not on file     Social History     Social History Narrative    Not on file        Social History     Tobacco History     Smoking Status  Never Smoker    Smokeless Tobacco Use  Never Used          Alcohol History     Alcohol Use Status  No          Drug Use     Drug Use Status  No          Sexual Activity     Sexually Active  Not Asked          Activities of Daily Living    Not Asked                     OBJECTIVE:     Mental Status Evaluation:    Appearance age appropriate, casually dressed   Behavior pleasant, cooperative   Speech normal volume, normal pitch   Mood appropriate   Affect euthymic   Thought Processes logical   Associations intact associations   Thought Content normal   Perceptual Disturbances: none   Abnormal Thoughts  Risk Potential Suicidal ideation - None  Homicidal ideation - None  Potential for aggression - No   Orientation oriented to person, place, time/date and situation   Memory recent and remote memory grossly intact   Cosciousness alert and awake   Attention Span attention span and concentration are age appropriate   Intellect Appears to be of Average Intelligence   Insight age appropriate    Judgement good    Muscle Strength and  Gait muscle strength and tone were normal   Language no difficulty naming common objects   Fund of Knowledge displays adequate knowledge of current events   Pain none   Pain Scale 0       Assessment/Plan:       Diagnoses and all orders for this visit:    Recurrent major depressive disorder, in full remission ([facility])    Generalized anxiety disorder    [facility]  -     clonazePAM (KlonoPIN) 0 5 mg tablet; Take 0 5 tablets (0 25 mg total) by mouth daily at bedtime as needed for anxiety    Other orders  -     magnesium 30 MG tablet; Take 30 mg by mouth 2 (two) times a day  -     glucosamine-chondroitin 500-400 MG tablet; Take 1 tablet by mouth 3 (three) times a day          Treatment Recommendations/Precautions:  Parnate 10 mg two tabs p o  B i d   Klonopin 0 25 mg p o  Q h s  P r n  Also discussed p r n  Melatonin for sleep in place of Klonopin if needed    Continue current medications    Risks/Benefits      Risks, Benefits And Possible Side Effects Of Medications:    Risks, benefits, and possible side effects of medications explained to patient and patient verbalizes understanding and agreement for treatment      Controlled Medication Discussion:   Taking as prescribed    Psychotherapy Provided:     Individual psychotherapy provided: No

## 2019-03-04 DIAGNOSIS — Z79.890 HORMONE REPLACEMENT THERAPY (POSTMENOPAUSAL): ICD-10-CM

## 2019-03-04 DIAGNOSIS — E03.9 HYPOTHYROIDISM, UNSPECIFIED TYPE: Primary | ICD-10-CM

## 2019-03-04 RX ORDER — ESTRADIOL 0.5 MG/1
TABLET ORAL
Qty: 90 TABLET | Refills: 1 | Status: SHIPPED | OUTPATIENT
Start: 2019-03-04

## 2019-03-04 RX ORDER — LEVOTHYROXINE SODIUM 75 UG/1
1 CAPSULE ORAL DAILY
Qty: 90 CAPSULE | Refills: 1 | Status: SHIPPED | OUTPATIENT
Start: 2019-03-04 | End: 2019-03-11 | Stop reason: SDUPTHER

## 2019-03-11 DIAGNOSIS — E03.9 HYPOTHYROIDISM, UNSPECIFIED TYPE: ICD-10-CM

## 2019-03-11 RX ORDER — LEVOTHYROXINE SODIUM 75 UG/1
1 CAPSULE ORAL DAILY
Qty: 90 CAPSULE | Refills: 1 | Status: SHIPPED | OUTPATIENT
Start: 2019-03-11 | End: 2019-08-19 | Stop reason: SDUPTHER

## 2019-03-12 NOTE — TELEPHONE ENCOUNTER
Left message on patients voice mail that script was sent to mail order  Advised to return phone call with any questions

## 2019-04-02 DIAGNOSIS — E03.9 HYPOTHYROIDISM, UNSPECIFIED TYPE: ICD-10-CM

## 2019-04-02 RX ORDER — LEVOTHYROXINE SODIUM 75 UG/1
1 CAPSULE ORAL DAILY
Qty: 90 CAPSULE | Refills: 1 | Status: CANCELLED | OUTPATIENT
Start: 2019-04-02

## 2019-04-03 DIAGNOSIS — E03.9 HYPOTHYROIDISM, UNSPECIFIED TYPE: ICD-10-CM

## 2019-04-04 RX ORDER — LEVOTHYROXINE SODIUM 75 UG/1
1 CAPSULE ORAL DAILY
Qty: 90 CAPSULE | Refills: 1 | OUTPATIENT
Start: 2019-04-04

## 2019-04-18 DIAGNOSIS — F33.40 RECURRENT MAJOR DEPRESSIVE DISORDER, IN REMISSION (HCC): ICD-10-CM

## 2019-04-18 RX ORDER — TRANYLCYPROMINE 10 MG/1
TABLET ORAL
Qty: 450 TABLET | Refills: 1 | Status: SHIPPED | OUTPATIENT
Start: 2019-04-18 | End: 2019-10-15 | Stop reason: SDUPTHER

## 2019-05-15 ENCOUNTER — OFFICE VISIT (OUTPATIENT)
Dept: PSYCHIATRY | Facility: CLINIC | Age: 64
End: 2019-05-15
Payer: MEDICARE

## 2019-05-15 VITALS — RESPIRATION RATE: 18 BRPM | HEIGHT: 65 IN | BODY MASS INDEX: 23.96 KG/M2

## 2019-05-15 DIAGNOSIS — F51.01 PRIMARY INSOMNIA: ICD-10-CM

## 2019-05-15 DIAGNOSIS — F41.1 GENERALIZED ANXIETY DISORDER: ICD-10-CM

## 2019-05-15 DIAGNOSIS — F33.40 RECURRENT MAJOR DEPRESSIVE DISORDER, IN REMISSION (HCC): Primary | ICD-10-CM

## 2019-05-15 PROCEDURE — 99214 OFFICE O/P EST MOD 30 MIN: CPT | Performed by: PHYSICIAN ASSISTANT

## 2019-07-03 ENCOUNTER — TELEPHONE (OUTPATIENT)
Dept: PSYCHIATRY | Facility: CLINIC | Age: 64
End: 2019-07-03

## 2019-07-03 DIAGNOSIS — F33.40 RECURRENT MAJOR DEPRESSIVE DISORDER, IN REMISSION (HCC): ICD-10-CM

## 2019-07-03 RX ORDER — CLONAZEPAM 0.5 MG/1
0.25 TABLET ORAL
Qty: 90 TABLET | Refills: 0 | Status: SHIPPED | OUTPATIENT
Start: 2019-07-03 | End: 2019-10-02 | Stop reason: SDUPTHER

## 2019-08-17 DIAGNOSIS — Z79.890 HORMONE REPLACEMENT THERAPY (POSTMENOPAUSAL): ICD-10-CM

## 2019-08-19 DIAGNOSIS — E03.9 HYPOTHYROIDISM, UNSPECIFIED TYPE: ICD-10-CM

## 2019-08-19 RX ORDER — LEVOTHYROXINE SODIUM 75 UG/1
1 CAPSULE ORAL DAILY
Qty: 90 CAPSULE | Refills: 0 | Status: SHIPPED | OUTPATIENT
Start: 2019-08-19

## 2019-08-19 NOTE — TELEPHONE ENCOUNTER
Christina called in for a refill and asked to speak with the   She was extremely upset and frustrated with how the change over of physicians and office location was handled  She said she never received any correspondence letting her know Aleah Knox was no longer with the office and also no correspondence regarding the move  She said she needs her refill as soon as possible due to leaving town and will be calling us back to let us know when she wants to transfer her records to   I tried to reassure her that she will still be taken care of the same but she said it was poor business handling and will be transferring out  Can we please process her refill

## 2019-08-19 NOTE — TELEPHONE ENCOUNTER
Patient was a patient of Ma[facility], her medication has been pended for some time and patient is in need of a refill  She is in the process of finding a new primary care office but would like a refill to last her for enough time to find a new doctor

## 2019-08-20 DIAGNOSIS — E03.9 HYPOTHYROIDISM, UNSPECIFIED TYPE: ICD-10-CM

## 2019-08-20 NOTE — TELEPHONE ENCOUNTER
Medication was supposed to go to her Mail order  Please resubmit  She did not  medication at retail  Thank you

## 2019-08-24 RX ORDER — LEVOTHYROXINE SODIUM 75 UG/1
1 CAPSULE ORAL DAILY
Qty: 90 CAPSULE | Refills: 0 | OUTPATIENT
Start: 2019-08-24

## 2019-09-06 DIAGNOSIS — Z79.890 HORMONE REPLACEMENT THERAPY (POSTMENOPAUSAL): ICD-10-CM

## 2019-09-06 RX ORDER — ESTRADIOL 0.5 MG/1
0.5 TABLET ORAL DAILY
Qty: 90 TABLET | Refills: 1 | OUTPATIENT
Start: 2019-09-06

## 2019-09-06 NOTE — TELEPHONE ENCOUNTER
Patient needs refill for Estradiol tablets  [facility]  Patient states she will be leaving for vacation tomorrow morning at 5 AM and will need it right away

## 2019-09-13 RX ORDER — ESTRADIOL 0.5 MG/1
TABLET ORAL
Qty: 90 TABLET | Refills: 4 | OUTPATIENT
Start: 2019-09-13

## 2019-09-13 NOTE — TELEPHONE ENCOUNTER
This message is not applicable any longer  Patient needed medication before 9/7  Can you please refuse medication so we can close encounter  Thank you

## 2019-10-02 ENCOUNTER — OFFICE VISIT (OUTPATIENT)
Dept: PSYCHIATRY | Facility: CLINIC | Age: 64
End: 2019-10-02
Payer: MEDICARE

## 2019-10-02 VITALS — RESPIRATION RATE: 20 BRPM | HEIGHT: 65 IN | BODY MASS INDEX: 25.49 KG/M2 | WEIGHT: 153 LBS

## 2019-10-02 DIAGNOSIS — F33.40 RECURRENT MAJOR DEPRESSIVE DISORDER, IN REMISSION (HCC): ICD-10-CM

## 2019-10-02 DIAGNOSIS — F51.01 PRIMARY INSOMNIA: ICD-10-CM

## 2019-10-02 DIAGNOSIS — F41.1 GENERALIZED ANXIETY DISORDER: Primary | ICD-10-CM

## 2019-10-02 PROCEDURE — 99214 OFFICE O/P EST MOD 30 MIN: CPT | Performed by: PHYSICIAN ASSISTANT

## 2019-10-02 RX ORDER — CLONAZEPAM 0.5 MG/1
TABLET ORAL
Qty: 90 TABLET | Refills: 0 | Status: SHIPPED | OUTPATIENT
Start: 2019-10-02 | End: 2020-07-08 | Stop reason: SDUPTHER

## 2019-10-02 NOTE — PSYCH
PROGRESS NOTE        [facility]      Name and Date of Birth:  Mayo Fritz 71 y o  1955    Date of Visit: 10/02/19    SUBJECTIVE:  Patient seen for follow-up of generalized anxiety and major depression  Pt states some difficulties with change in season and turning to fall  States this has typically been a difficult time of year for her  Also reports some stressors with her neighbor whose spouse committed suicide in August   She has been at her baseline overall though and handling things with current meds  No sides effects with meds and feels at her baseline  She did see a new PCP and is happy with her  She is looking into seeing a Gyn about the estradiol  Continues with some sleep hygiene issues and has gotten off track  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  No new physical issues or medical problems  She denies suicidal ideation, intent or plan at present, has no suicidal ideation, intent or plan at present  She denies any auditory hallucinations and visual hallucinations, denies any other delusional thinking, denies any delusional thinking  She denies any side effects from medications    HPI ROS Appetite Changes and Sleep: normal appetite, normal sleep    Review Of Systems:      Constitutional Negative   ENT Negative   Cardiovascular Negative   Respiratory Negative   Gastrointestinal Negative   Genitourinary Negative   Musculoskeletal Negative   Integumentary Negative   Neurological Negative   Endocrine Negative   Other Symptoms Negative and None       Laboratory Results: No results found for this or any previous visit      Substance Abuse History:    Social History     Substance and Sexual Activity   Drug Use No       Family Psychiatric History:     Family History   Problem Relation Age of Onset    Pancreatic cancer Mother     Depression Mother         Recurrent    Depression Father Recurrent    Other Father         Anxiety       The following portions of the patient's history were reviewed and updated as appropriate: past family history, past medical history, past social history, past surgical history and problem list     Social History     Socioeconomic History    Marital status: /Civil Union     Spouse name: Not on file    Number of children: Not on file    Years of education: Not on file    Highest education level: Not on file   Occupational History     Comment: Currently works full-time   Social Needs    Financial resource strain: Not on file    Food insecurity:     Worry: Not on file     Inability: Not on file   [employer] needs:     Medical: Not on file     Non-medical: Not on file   Tobacco Use    Smoking status: Never Smoker    Smokeless tobacco: Never Used   Substance and Sexual Activity    Alcohol use: No    Drug use: No    Sexual activity: Not on file   Lifestyle    Physical activity:     Days per week: Not on file     Minutes per session: Not on file    Stress: Not on file   Relationships    Social connections:     Talks on phone: Not on file     Gets together: Not on file     Attends Shinto service: Not on file     Active member of club or organization: Not on file     Attends meetings of clubs or organizations: Not on file     Relationship status: Not on file    Intimate partner violence:     Fear of current or ex partner: Not on file     Emotionally abused: Not on file     Physically abused: Not on file     Forced sexual activity: Not on file   Other Topics Concern    Not on file   Social History Narrative    Not on file     Social History     Social History Narrative    Not on file        Social History     Tobacco History     Smoking Status  Never Smoker    Smokeless Tobacco Use  Never Used          Alcohol History     Alcohol Use Status  No          Drug Use     Drug Use Status  No          Sexual Activity     Sexually Active  Not Asked Activities of Daily Living    Not Asked                     OBJECTIVE:     Mental Status Evaluation:    Appearance age appropriate, casually dressed   Behavior pleasant, cooperative   Speech normal volume, normal pitch, rambling   Mood euthymic   Affect appropriate   Thought Processes tangential   Associations intact associations   Thought Content normal   Perceptual Disturbances: none   Abnormal Thoughts  Risk Potential Suicidal ideation - None  Homicidal ideation - None  Potential for aggression - No   Orientation oriented to person, place, time/date and situation   Memory recent and remote memory grossly intact   Cosciousness alert and awake   Attention Span attention span and concentration are age appropriate   Intellect Appears to be of Average Intelligence   Insight age appropriate    Judgement good    Muscle Strength and  Gait muscle strength and tone were normal   Language no difficulty naming common objects   Fund of Knowledge displays adequate knowledge of current events   Pain none   Pain Scale 0       Assessment/Plan:       Diagnoses and all orders for this visit:    Generalized anxiety disorder    Rec[facility] ([facility] )  -     clonazePAM (KlonoPIN) 0 5 mg tablet; 1/2- 1 po qhs prn    Primary insomnia          Treatment Recommendations/Precautions:  Parnate 10 mg two in the morning, two in the afternoon and one at night  Klonopin 0 5 - 0 25 mg q h s  P r n  Follow-up four months    Continue current medications    Risks/Benefits      Risks, Benefits And Possible Side Effects Of Medications:    Risks, benefits, and possible side effects of medications explained to patient and patient verbalizes understanding and agreement for treatment      Controlled Medication Discussion:   Taking as prescribed    Psychotherapy Provided:     Individual psychotherapy provided: No

## 2019-10-15 DIAGNOSIS — F33.40 RECURRENT MAJOR DEPRESSIVE DISORDER, IN REMISSION (HCC): ICD-10-CM

## 2019-10-15 RX ORDER — TRANYLCYPROMINE 10 MG/1
TABLET ORAL
Qty: 450 TABLET | Refills: 4 | Status: SHIPPED | OUTPATIENT
Start: 2019-10-15 | End: 2020-07-08 | Stop reason: SDUPTHER

## 2020-02-26 ENCOUNTER — OFFICE VISIT (OUTPATIENT)
Dept: PSYCHIATRY | Facility: CLINIC | Age: 65
End: 2020-02-26
Payer: MEDICARE

## 2020-02-26 VITALS — BODY MASS INDEX: 25.49 KG/M2 | WEIGHT: 153 LBS | RESPIRATION RATE: 18 BRPM | HEIGHT: 65 IN

## 2020-02-26 DIAGNOSIS — F51.01 PRIMARY INSOMNIA: ICD-10-CM

## 2020-02-26 DIAGNOSIS — F41.1 GENERALIZED ANXIETY DISORDER: ICD-10-CM

## 2020-02-26 DIAGNOSIS — F33.40 RECURRENT MAJOR DEPRESSIVE DISORDER, IN REMISSION (HCC): Primary | ICD-10-CM

## 2020-02-26 PROCEDURE — 3008F BODY MASS INDEX DOCD: CPT | Performed by: PHYSICIAN ASSISTANT

## 2020-02-26 PROCEDURE — 1036F TOBACCO NON-USER: CPT | Performed by: PHYSICIAN ASSISTANT

## 2020-02-26 PROCEDURE — 1160F RVW MEDS BY RX/DR IN RCRD: CPT | Performed by: PHYSICIAN ASSISTANT

## 2020-02-26 PROCEDURE — 99214 OFFICE O/P EST MOD 30 MIN: CPT | Performed by: PHYSICIAN ASSISTANT

## 2020-02-26 NOTE — PSYCH
PROGRESS NOTE        [facility]      Name and Date of Birth:  Luba Beyer 76 y o  1955    Date of Visit: 02/26/20    SUBJECTIVE:  Pt seen for follow up MDD and ALECIA  Pt states overall she has been handling things and anxiety usually manageable  She has been trying to not take klonopin every night and has uses 30 tablet over past 4 months  She continues to do yoga and meditation on a regular basis  Sleep is overall adequate  Appetite is fair  She has fair motivation and interest   Medically she is doing well and no issues  She denies suicidal ideation, intent or plan at present, has no suicidal ideation, intent or plan at present  She denies any auditory hallucinations and visual hallucinations, denies any other delusional thinking, denies any delusional thinking  She denies any side effects from medications    HPI ROS Appetite Changes and Sleep: normal appetite, normal sleep    Review Of Systems:      Constitutional Negative   ENT Negative   Cardiovascular Negative   Respiratory Negative   Gastrointestinal Negative   Genitourinary Negative   Musculoskeletal Negative   Integumentary Negative   Neurological Negative   Endocrine Negative   Other Symptoms Negative and None       Laboratory Results: No results found for this or any previous visit      Substance Abuse History:    Social History     Substance and Sexual Activity   Drug Use No       Family Psychiatric History:     Family History   Problem Relation Age of Onset    Pancreatic cancer Mother     Depression Mother         Recurrent    Depression Father         Recurrent    Other Father         Anxiety       The following portions of the patient's history were reviewed and updated as appropriate: past family history, past medical history, past social history, past surgical history and problem list     Social History     Socioeconomic History    Marital status: /Civil Union Spouse name: Not on file    Number of children: Not on file    Years of education: Not on file    Highest education level: Not on file   Occupational History     Comment: Currently works full-time   Social Needs    Financial resource strain: Not on file    Food insecurity:     Worry: Not on file     Inability: Not on file   [employer] needs:     Medical: Not on file     Non-medical: Not on file   Tobacco Use    Smoking status: Never Smoker    Smokeless tobacco: Never Used   Substance and Sexual Activity    Alcohol use: No    Drug use: No    Sexual activity: Not on file   Lifestyle    Physical activity:     Days per week: Not on file     Minutes per session: Not on file    Stress: Not on file   Relationships    Social connections:     Talks on phone: Not on file     Gets together: Not on file     Attends Evangelical service: Not on file     Active member of club or organization: Not on file     Attends meetings of clubs or organizations: Not on file     Relationship status: Not on file    Intimate partner violence:     Fear of current or ex partner: Not on file     Emotionally abused: Not on file     Physically abused: Not on file     Forced sexual activity: Not on file   Other Topics Concern    Not on file   Social History Narrative    Not on file     Social History     Social History Narrative    Not on file        Social History     Tobacco History     Smoking Status  Never Smoker    Smokeless Tobacco Use  Never Used          Alcohol History     Alcohol Use Status  No          Drug Use     Drug Use Status  No          Sexual Activity     Sexually Active  Not Asked          Activities of Daily Living    Not Asked                     OBJECTIVE:     Mental Status Evaluation:    Appearance age appropriate, casually dressed   Behavior pleasant, cooperative   Speech normal volume, normal pitch   Mood euthymic   Affect appropriate   Thought Processes logical   Associations intact associations Thought Content normal   Perceptual Disturbances: none   Abnormal Thoughts  Risk Potential Suicidal ideation - None  Homicidal ideation - None  Potential for aggression - No   Orientation oriented to person, place, time/date and situation   Memory recent and remote memory grossly intact   Cosciousness alert and awake   Attention Span attention span and concentration are age appropriate   Intellect Appears to be of Average Intelligence   Insight age appropriate    Judgement good    Muscle Strength and  Gait muscle strength and tone were normal   Language no difficulty naming common objects   Fund of Knowledge displays adequate knowledge of current events   Pain none   Pain Scale 0       Assessment/Plan:       Diagnoses and all orders for this visit:    Recurrent major depressive disorder, in remission ([facility] )    Generalized anxiety disorder    Primary insomnia          Treatment Recommendations/Precautions:  Parnate 10 mg 2 po qam and 2 po qpm   Klonopin 0 25-0 5 mh po qhs prn- not taking every night and rarely needing  Will try melatonin prn in place of klonopin  Will try to continue with gradual reduction in meds  Follow in 4 months    Risks/Benefits      Risks, Benefits And Possible Side Effects Of Medications:    Risks, benefits, and possible side effects of medications explained to patient and patient verbalizes understanding and agreement for treatment      Controlled Medication Discussion:     Taking as prescribed    Psychotherapy Provided:     Individual psychotherapy provided: No

## 2020-07-08 ENCOUNTER — TELEMEDICINE (OUTPATIENT)
Dept: PSYCHIATRY | Facility: CLINIC | Age: 65
End: 2020-07-08
Payer: MEDICARE

## 2020-07-08 DIAGNOSIS — F33.40 RECURRENT MAJOR DEPRESSIVE DISORDER, IN REMISSION (HCC): Primary | ICD-10-CM

## 2020-07-08 DIAGNOSIS — F51.01 PRIMARY INSOMNIA: ICD-10-CM

## 2020-07-08 PROCEDURE — 99443 PR PHYS/QHP TELEPHONE EVALUATION 21-30 MIN: CPT | Performed by: PHYSICIAN ASSISTANT

## 2020-07-08 RX ORDER — TRANYLCYPROMINE 10 MG/1
TABLET ORAL
Qty: 360 TABLET | Refills: 1 | Status: SHIPPED | OUTPATIENT
Start: 2020-07-08 | End: 2020-11-06 | Stop reason: SDUPTHER

## 2020-07-08 RX ORDER — CLONAZEPAM 0.5 MG/1
TABLET ORAL
Qty: 90 TABLET | Refills: 0 | Status: SHIPPED | OUTPATIENT
Start: 2020-07-08 | End: 2020-10-26 | Stop reason: SDUPTHER

## 2020-07-08 NOTE — BH TREATMENT PLAN
TREATMENT PLAN (Medication Management Only)        [facility]    Name and Date of Birth:  Jevon Pena 76 y o  1955  Date of Treatment Plan: July 8, 2020  Diagnosis/Diagnoses:    1  Recurrent major depressive disorder, in remission ([facility] )    2  Primary insomnia      Strengths/Personal Resources for Self-Care: supportive family, supportive friends, taking medications as prescribed  Area/Areas of need (in own words): anxiety symptoms  1  Long Term Goal: continue improvement in acceptable anxiety level  Target Date: 2 months - 9/8/2020  Person/Persons responsible for completion of goal: CICI Vasquez  2  Short Term Objective (s) - How will we reach this goal?:   A  Provider new recommended medication/dosage changes and/or continue medication(s): continue current medications as prescribed  B  Continue physical activity, yoga and meditation  C  N/A  Target Date: 3 months - 10/8/2020  Person/Persons Responsible for Completion of Goal: CICI Vasquez  Progress Towards Goals: continuing treatment  Treatment Modality: medication management every 3 months  Review due 90 to 120 days from date of this plan: Six months  Expected length of service: ongoing treatment  My Physician/PA/NP and I have developed this plan together and I agree to work on the goals and objectives  I understand the treatment goals that were developed for my treatment  Patient's appointment done via telephone due to Tuan so patient unable to sign treatment plan

## 2020-07-10 ENCOUNTER — NURSE TRIAGE (OUTPATIENT)
Dept: OTHER | Facility: OTHER | Age: 65
End: 2020-07-10

## 2020-07-10 DIAGNOSIS — Z20.828 SARS-ASSOCIATED CORONAVIRUS EXPOSURE: Primary | ICD-10-CM

## 2020-07-10 DIAGNOSIS — Z20.828 SARS-ASSOCIATED CORONAVIRUS EXPOSURE: ICD-10-CM

## 2020-07-10 PROCEDURE — U0003 INFECTIOUS AGENT DETECTION BY NUCLEIC ACID (DNA OR RNA); SEVERE ACUTE RESPIRATORY SYNDROME CORONAVIRUS 2 (SARS-COV-2) (CORONAVIRUS DISEASE [COVID-19]), AMPLIFIED PROBE TECHNIQUE, MAKING USE OF HIGH THROUGHPUT TECHNOLOGIES AS DESCRIBED BY CMS-2020-01-R: HCPCS

## 2020-07-10 NOTE — TELEPHONE ENCOUNTER
Reason for Disposition   [1] COVID-19 EXPOSURE (Close Contact) AND [2] within last 14 days BUT [3] NO symptoms    Answer Assessment - Initial Assessment Questions  1  CLOSE CONTACT: "Who is the person with the confirmed or suspected COVID-19 infection that you were exposed to?"      Family friend tested positive for Covid  2  PLACE of CONTACT: "Where were you when you were exposed to COVID-19?" (e g , home, school, medical waiting room; which city?)      Home  3  TYPE of CONTACT: "How much contact was there?" (e g , sitting next to, live in same house, work in same office, same building)      Close  4  DURATION of CONTACT: "How long were you in contact with the COVID-19 patient?" (e g , a few seconds, passed by person, a few minutes, live with the patient)      Several hours  5  DATE of CONTACT: "When did you have contact with a COVID-19 patient?" (e g , how many days ago)      7/1/2020      6  TRAVEL: "Have you traveled out of the country recently?" If so, "When and where?"      * Also ask about out-of-state travel, since the CDC has identified some high-risk cities for community spread in the [address]  * Note: Travel becomes less relevant if there is widespread community transmission where the patient lives  Denies  7  COMMUNITY SPREAD: "Are there lots of cases of COVID-19 (community spread) where you live?" (See public health department website, if unsure)        Uncertain  8  SYMPTOMS: "Do you have any symptoms?" (e g , fever, cough, breathing difficulty)      No symptoms at this time  9  HIGH RISK: "Do you have any heart or lung problems? Do you have a weak immune system?" (e g , CHF, COPD, asthma, HIV positive, chemotherapy, renal failure, diabetes mellitus, sickle cell anemia)        Denies  Protocols used: CORONAVIRUS (OLDVM-42) EXPOSURE-ADULT-AH    States she is no longer a pt at [facility]  Her PCP is out of network and unable to test for Covid   Is asking to be tested

## 2020-07-10 NOTE — TELEPHONE ENCOUNTER
Regarding: corona virus  ----- Message from Rekha Kelley sent at 7/10/2020  1:00 PM EDT -----  2 of 2   Pt was recently exposed to a covid 19 positive and would like to be tested

## 2020-07-17 LAB — SARS-COV-2 RNA SPEC QL NAA+PROBE: NOT DETECTED

## 2020-10-26 ENCOUNTER — TELEMEDICINE (OUTPATIENT)
Dept: PSYCHIATRY | Facility: CLINIC | Age: 65
End: 2020-10-26
Payer: MEDICARE

## 2020-10-26 DIAGNOSIS — F51.01 PRIMARY INSOMNIA: ICD-10-CM

## 2020-10-26 DIAGNOSIS — F41.1 GENERALIZED ANXIETY DISORDER: ICD-10-CM

## 2020-10-26 DIAGNOSIS — F33.40 RECURRENT MAJOR DEPRESSIVE DISORDER, IN REMISSION (HCC): Primary | ICD-10-CM

## 2020-10-26 PROCEDURE — 99442 PR PHYS/QHP TELEPHONE EVALUATION 11-20 MIN: CPT | Performed by: PHYSICIAN ASSISTANT

## 2020-10-26 RX ORDER — CLONAZEPAM 0.5 MG/1
TABLET ORAL
Qty: 90 TABLET | Refills: 0 | Status: SHIPPED | OUTPATIENT
Start: 2020-10-26 | End: 2021-06-23 | Stop reason: SDUPTHER

## 2020-11-06 DIAGNOSIS — F33.40 RECURRENT MAJOR DEPRESSIVE DISORDER, IN REMISSION (HCC): ICD-10-CM

## 2020-11-06 RX ORDER — TRANYLCYPROMINE 10 MG/1
TABLET ORAL
Qty: 360 TABLET | Refills: 0 | Status: SHIPPED | OUTPATIENT
Start: 2020-11-06 | End: 2021-02-25 | Stop reason: SDUPTHER

## 2020-11-06 RX ORDER — TRANYLCYPROMINE 10 MG/1
TABLET ORAL
Qty: 450 TABLET | Refills: 3 | OUTPATIENT
Start: 2020-11-06

## 2021-02-25 ENCOUNTER — OFFICE VISIT (OUTPATIENT)
Dept: PSYCHIATRY | Facility: CLINIC | Age: 66
End: 2021-02-25
Payer: MEDICARE

## 2021-02-25 DIAGNOSIS — F33.40 RECURRENT MAJOR DEPRESSIVE DISORDER, IN REMISSION (HCC): Primary | ICD-10-CM

## 2021-02-25 DIAGNOSIS — F41.1 GENERALIZED ANXIETY DISORDER: ICD-10-CM

## 2021-02-25 DIAGNOSIS — F51.01 PRIMARY INSOMNIA: ICD-10-CM

## 2021-02-25 PROCEDURE — 99214 OFFICE O/P EST MOD 30 MIN: CPT | Performed by: PHYSICIAN ASSISTANT

## 2021-02-25 RX ORDER — TRANYLCYPROMINE 10 MG/1
TABLET ORAL
Qty: 360 TABLET | Refills: 0 | Status: SHIPPED | OUTPATIENT
Start: 2021-02-25 | End: 2021-05-10

## 2021-02-25 NOTE — BH TREATMENT PLAN
TREATMENT PLAN (Medication Management Only)        [facility]    Name and Date of Birth:  Sherry White 78 y o  1955  Date of Treatment Plan: February 25, 2021  Diagnosis/Diagnoses:    1  Recurrent major depressive disorder, in remission ([facility] [address] )    2  Primary insomnia    3  Generalized anxiety disorder      Strengths/Personal Resources for Self-Care: supportive family, supportive friends, taking medications as prescribed  Area/Areas of need (in own words): I feel content  1  Long Term Goal: maintain mood stability  Target Date:6 months - 8/25/2021  Person/Persons responsible for completion of goal: CICI Vasquez  2  Short Term Objective (s) - How will we reach this goal?:   A  Provider new recommended medication/dosage changes and/or continue medication(s): continue current medications as prescribed  B  N/A   C  N/A  Target Date:6 months - 8/25/2021  Person/Persons Responsible for Completion of Goal: CICI Vasquez  Progress Towards Goals: stable  Treatment Modality: medication management every 4 months  Review due 180 days from date of this plan: 6 months - 8/25/2021  Expected length of service: ongoing treatment  My Physician/PA/NP and I have developed this plan together and I agree to work on the goals and objectives  I understand the treatment goals that were developed for my treatment

## 2021-02-25 NOTE — PSYCH
PROGRESS NOTE        [facility]      Name and Date of Birth:  Melina Beltran 78 y o  1955    Date of Visit: 02/25/21    SUBJECTIVE:   Hector Rascon was seen for follow-up of anxiety, depression and medication check  Overall states she has been feeling "content" with her mood  No signifiacnt depressive episodes or anxiety attacks  States some "anticipatory" anxiety at times but managing fairly well  Occasioanl shortness of breath since having Covid but otherwise doing well physically  She feels like she is in a "really good place"  Able to enjoy herself  Good relationship with spouse and family  Has been rarely needing the clonazepam at bedtime for sleep  Typically sleeping well and adequate appetite  Taking Parnate 4 times a day  We discussed possibly trying by reduction of this in the future as she remains stable  She is aware of food restrictions with taking Parnate  She denies suicidal ideation, intent or plan at present, has no suicidal ideation, intent or plan at present  She denies any auditory hallucinations and visual hallucinations, denies any other delusional thinking, denies any delusional thinking  She denies any side effects from medications    HPI ROS Appetite Changes and Sleep: normal appetite, normal sleep    Review Of Systems:      Constitutional Negative   ENT Negative   Cardiovascular Negative   Respiratory Negative   Gastrointestinal Negative   Genitourinary Negative   Musculoskeletal Negative   Integumentary Negative   Neurological Negative   Endocrine Negative   Other Symptoms Negative and None       Laboratory Results: No results found for this or any previous visit      Substance Abuse History:    Social History     Substance and Sexual Activity   Drug Use No       Family Psychiatric History:     Family History   Problem Relation Age of Onset    Pancreatic cancer Mother     Depression Mother         Recurrent    Depression Father         Recurrent    Other Father         Anxiety       The following portions of the patient's history were reviewed and updated as appropriate: past family history, past medical history, past social history, past surgical history and problem list     Social History     Socioeconomic History    Marital status: /Civil Union     Spouse name: Not on file    Number of children: Not on file    Years of education: Not on file    Highest education level: Not on file   Occupational History     Comment: Currently works full-time   Social Needs    Financial resource strain: Not on file    Food insecurity     Worry: Not on file     Inability: Not on file   [employer] needs     Medical: Not on file     Non-medical: Not on file   Tobacco Use    Smoking status: Never Smoker    Smokeless tobacco: Never Used   Substance and Sexual Activity    Alcohol use: No    Drug use: No    Sexual activity: Not on file   Lifestyle    Physical activity     Days per week: Not on file     Minutes per session: Not on file    Stress: Not on file   Relationships    Social connections     Talks on phone: Not on file     Gets together: Not on file     Attends Latter-day service: Not on file     Active member of club or organization: Not on file     Attends meetings of clubs or organizations: Not on file     Relationship status: Not on file    Intimate partner violence     Fear of current or ex partner: Not on file     Emotionally abused: Not on file     Physically abused: Not on file     Forced sexual activity: Not on file   Other Topics Concern    Not on file   Social History Narrative    Not on file     Social History     Social History Narrative    Not on file        Social History     Tobacco History     Smoking Status  Never Smoker    Smokeless Tobacco Use  Never Used          Alcohol History     Alcohol Use Status  No          Drug Use     Drug Use Status  No          Sexual Activity     Sexually Active  Not Asked          Activities of Daily Living    Not Asked                     OBJECTIVE:     Mental Status Evaluation:    Appearance age appropriate, casually dressed   Behavior pleasant, cooperative   Speech normal volume, normal pitch   Mood  euthymic   Affect  appropriate   Thought Processes circumstantial   Associations intact associations   Thought Content normal   Perceptual Disturbances: none   Abnormal Thoughts  Risk Potential Suicidal ideation - None  Homicidal ideation - None  Potential for aggression - No   Orientation oriented to person, place, time/date and situation   Memory recent and remote memory grossly intact   Cosciousness alert and awake   Attention Span attention span and concentration are age appropriate   Intellect  not formally assessed   Insight age appropriate    Judgement good    Muscle Strength and  Gait  steady gait   Language no difficulty naming common objects   Fund of Knowledge displays adequate knowledge of current events   Pain none   Pain Scale 0       Assessment/Plan:       Diagnoses and all orders for this visit:    Recurrent major depressive disorder, in remission ([facility] )    Primary insomnia    Generalized anxiety disorder          Treatment Recommendations/Precautions:   Parnate 10 mg two in the morning and two at night -prescription sent to [facility] for 90 day supply  Klonopin 0 5 mg half to one at bedtime as needed, not taking every night, prescription not needed at this time    Chart reviewed, medical records reviewed and labs reviewed from November 2020   follow-up in four months and will call sooner if any questions or concerns    Risks/Benefits      Risks, Benefits And Possible Side Effects Of Medications:    Risks, benefits, and possible side effects of medications explained to patient and patient verbalizes understanding and agreement for treatment      Controlled Medication Discussion:       Taking as prescribed    Psychotherapy Provided:     Individual psychotherapy provided: No

## 2021-03-05 NOTE — PSYCH
Virtual Brief Visit    Assessment/Plan:    Problem List Items Addressed This Visit        Other    Recurrent major depressive disorder ([address] ) - Primary    Relevant Medications    tranylcypromine (PARNATE) 10 mg tablet    clonazePAM (KlonoPIN) 0 5 mg tablet      Other Visit Diagnoses     Primary insomnia                    Reason for visit is   Chief Complaint   Patient presents with    Follow-up    Medication Management    Virtual Brief Visit        Encounter provider Ainsley Araya PA-C    Provider located at [address] [address]    Recent Visits  No visits were found meeting these conditions  Showing recent visits within past 7 days and meeting all other requirements     Today's Visits  Date Type Provider Dept   07/08/20 Telemedicine JAR[address] today's visits and meeting all other requirements     Future Appointments  No visits were found meeting these conditions  Showing future appointments within next 150 days and meeting all other requirements        After connecting through telephone, the patient was identified by name and date of birth  CICI Zuleta was informed that this is a telemedicine visit and that the visit is being conducted through telephone  My office door was closed  No one else was in the room  She acknowledged consent and understanding of privacy and security of the platform  The patient has agreed to participate and understands she can discontinue the visit at any time  Patient is aware this is a billable service  Subjective    CICI Zuleta is a 76 y o  female with ALECIA,MDD  HPI   Pt seen for follow up anxiety and depression  Pt states she has had some increased anxiety due to Covid outbreak and restrictions  Despite increase anxiety she feels as though she is managing fairly well  Sleeping well and appetite is good    She has a good support group with her spouse and friends  Occasionally takes klonopin at night  Physically she is doing well  Past Medical History:   Diagnosis Date    Malignant hypertension     Last Assessed:  9/25/17       Past Surgical History:   Procedure Laterality Date    APPENDECTOMY  1990    HYSTERECTOMY         Current Outpatient Medications   Medication Sig Dispense Refill    cholecalciferol (VITAMIN D3) 1,000 units tablet Take 5 tablets by mouth daily        clonazePAM (KlonoPIN) 0 5 mg tablet 1/2- 1 po qhs prn 90 tablet 0    estradiol (ESTRACE) 0 5 MG tablet TAKE 1 TABLET DAILY 90 tablet 1    glucosamine-chondroitin 500-400 MG tablet Take 1 tablet by mouth 3 (three) times a day      Levothyroxine Sodium 75 MCG CAPS Take 1 capsule (75 mcg total) by mouth daily 90 capsule 0    magnesium 30 MG tablet Take 30 mg by mouth 2 (two) times a day      Multiple Vitamins-Minerals (MULTIVITAMIN ADULT PO) Take 1 tablet by mouth daily      tranylcypromine (PARNATE) 10 mg tablet 2 po bid 360 tablet 1     No current facility-administered medications for this visit  Allergies   Allergen Reactions    Percocet [Oxycodone-Acetaminophen] Rash, Hives and Itching     As per patient       Review of Systems   No c/o pain or discomfort, sleeping and eating well    MSE:  Speech is clear but pressured  Mood anxious   Linear and coherent process  No SI or HI  No auditory or visual hallucinations  Cognition intact  Insight and judgement intact    There were no vitals filed for this visit  Continue with medications as follows:  Parnate 10 mg two in the morning and two at night  Klonopin 0 25 mg at HS as needed  Melatonin as needed  Follow-up three months    I spent 25 minutes directly with the patient during this visit    [facility]IT [id number] [facility] Pilar acknowledges that she has consented to an online visit or consultation   She understands that the online visit is based solely on information provided by her, and that, in the absence of a face-to-face physical evaluation by the physician, the diagnosis she receives is both limited and provisional in terms of accuracy and completeness  This is not intended to replace a full medical face-to-face evaluation by the physician  Isabella Red understands and accepts these terms    Follow Up:  resp failure    Interval History/ROS: sedated on vent    Allergies  Plavix (Rash)  Zosyn (Short breath)    ANTIMICROBIALS:  meropenem  IVPB 1000 every 12 hours  vancomycin    Solution 125 every 6 hours      OTHER MEDS:  MEDICATIONS  (STANDING):  acetaminophen   Tablet .. 975 every 6 hours PRN  albuterol/ipratropium for Nebulization 3 every 6 hours  aMIOdarone    Tablet 200 daily  enoxaparin Injectable 160 every 12 hours  HYDROmorphone  Injectable 0.5 every 3 hours PRN  influenza   Vaccine 0.5 once  norepinephrine Infusion 0.05 <Continuous>      Vital Signs Last 24 Hrs  T(C): 36.4 (05 Mar 2021 15:00), Max: 36.5 (04 Mar 2021 20:30)  T(F): 97.5 (05 Mar 2021 15:00), Max: 97.7 (04 Mar 2021 20:30)  HR: 66 (05 Mar 2021 18:30) (61 - 120)  BP: 93/44 (05 Mar 2021 00:00) (71/55 - 193/139)  BP(mean): 63 (05 Mar 2021 00:00) (60 - 162)  RR: 31 (05 Mar 2021 18:30) (5 - 57)  SpO2: 99% (05 Mar 2021 18:30) (75% - 100%)    PHYSICAL EXAM:  General: ill appearing, pale NAD  Neurology: sedated on vent   Respiratory: Clear to auscultation bilaterally  CV: RRR, S1S2, no murmurs, rubs or gallops  Abdominal: Soft, Non-tender, non-distended  Extremities: No edema  Line Sites: Clear, PIV in dorsum of left foot  Skin: No rash                        7.6    20.04 )-----------( 354      ( 04 Mar 2021 21:08 )             26.2   WBC Count: 20.04 ( @ 21:08)  WBC Count: 22.66 ( @ 05:19)  WBC Count: 21.42 ( @ 21:58)  WBC Count: 14.39 ( @ 03:04)  WBC Count: 12.85 ( @ 02:30)  WBC Count: 11.71 ( @ 02:41)        142  |  111<H>  |  58<H>  ----------------------------<  125<H>  4.3   |  16<L>  |  1.63<H>  Creatinine: 1.63 ( @ 17:13)    Creatinine: 1.68 ( @ 21:08)    Creatinine: 1.62 ( @ 17:15)    Creatinine: 1.55 ( @ 05:19)    Creatinine: 1.48 ( @ 00:39)    Creatinine: 1.34 ( @ 21:58)    Creatinine: 1.09 ( @ 03:04)    Creatinine: 1.01 ( @ 13:49)    Creatinine: 0.93 ( @ 02:30)    Creatinine: 0.97 ( @ 13:36)    Creatinine: 1.04 ( @ 02:41)      Ca    7.7<L>      05 Mar 2021 17:13  Phos  5.8       Mg     2.0         TPro  6.0  /  Alb  1.3<L>  /  TBili  0.4  /  DBili  0.2  /  AST  19  /  ALT  16  /  AlkPhos  337<H>        Urinalysis Basic - ( 05 Mar 2021 10:20 )    Color: Yellow / Appearance: Slightly Turbid / S.023 / pH: x  Gluc: x / Ketone: Negative  / Bili: Negative / Urobili: 2 mg/dL   Blood: x / Protein: 30 mg/dL / Nitrite: Negative   Leuk Esterase: Negative / RBC: 2 /hpf / WBC 6 /HPF   Sq Epi: x / Non Sq Epi: 1 /hpf / Bacteria: Negative    MICROBIOLOGY:  .Blood Blood-Peripheral  21   No growth to date.  --  --      .Blood Blood-Venous  21   No growth to date.  --  --      .Blood Blood  21   No Growth Final  --  --      .Blood Blood-Venous  21   No growth  --  --      Bronch Wash Combicath  21   Normal Respiratory Katlin present  --    Rare Squamous epithelial cells per low power field  Moderate polymorphonuclear leukocytes per low power field  Few Gram Positive Cocci per oil power field      .Blood Blood-Peripheral  21   No Growth Final  --  --      .Blood Blood-Peripheral  21   No Growth Final  --  --      .Urine Catheterized  21   <10,000 CFU/mL Normal Urogenital Katlin  --  --      .Blood Blood-Peripheral  21   No Growth Final  --  --      .Blood Blood-Peripheral  21   No Growth Final  --  --    Vancomycin Level, Random: 14.8 ug/mL (21 @ 06:38)          RADIOLOGY:  < from: Xray Chest 1 View- PORTABLE-Urgent (Xray Chest 1 View- PORTABLE-Urgent .) (21 @ 10:49) >  IMPRESSION:  Endotracheal tube is within the trachea above the level of the winston. Probable small left pleural effusion and/or left basilar atelectasis.    < end of copied text >    Tyson Cunha MD; Division of Infectious Disease; Pager: [phone]; nights and weekends: [phone]

## 2021-03-10 DIAGNOSIS — Z23 ENCOUNTER FOR IMMUNIZATION: ICD-10-CM

## 2021-05-09 DIAGNOSIS — F33.40 RECURRENT MAJOR DEPRESSIVE DISORDER, IN REMISSION (HCC): ICD-10-CM

## 2021-05-10 RX ORDER — TRANYLCYPROMINE 10 MG/1
TABLET ORAL
Qty: 360 TABLET | Refills: 0 | Status: SHIPPED | OUTPATIENT
Start: 2021-05-10 | End: 2021-08-12

## 2021-06-23 ENCOUNTER — TELEMEDICINE (OUTPATIENT)
Dept: PSYCHIATRY | Facility: CLINIC | Age: 66
End: 2021-06-23
Payer: MEDICARE

## 2021-06-23 DIAGNOSIS — F51.01 PRIMARY INSOMNIA: ICD-10-CM

## 2021-06-23 DIAGNOSIS — F41.1 GENERALIZED ANXIETY DISORDER: ICD-10-CM

## 2021-06-23 DIAGNOSIS — F33.40 RECURRENT MAJOR DEPRESSIVE DISORDER, IN REMISSION (HCC): Primary | ICD-10-CM

## 2021-06-23 PROCEDURE — 99214 OFFICE O/P EST MOD 30 MIN: CPT | Performed by: PHYSICIAN ASSISTANT

## 2021-06-23 RX ORDER — CLONAZEPAM 0.5 MG/1
TABLET ORAL
Qty: 90 TABLET | Refills: 0 | Status: SHIPPED | OUTPATIENT
Start: 2021-06-23 | End: 2021-10-25

## 2021-06-23 NOTE — PSYCH
Virtual Regular Visit      Assessment/Plan:    Problem List Items Addressed This Visit        Other    Anxiety disorder    Recurrent major depressive disorder ([facility] ) - Primary    Relevant Medications    clonazePAM (KlonoPIN) 0 5 mg tablet      Other Visit Diagnoses     Primary insomnia              Goals addressed in session: Goal 1          Reason for visit is   Chief Complaint   Patient presents with    Follow-up    Medication Management    Virtual Regular Visit        Encounter provider Lura Holstein, PA-C    Provider located at [address] [address]      Recent Visits  No visits were found meeting these conditions  Showing recent visits within past 7 days and meeting all other requirements  Today's Visits  Date Type Provider Dept   06/23/21 Telemedicine Lura Holstein, PA-C [address] today's visits and meeting all other requirements  Future Appointments  No visits were found meeting these conditions  Showing future appointments within next 150 days and meeting all other requirements       The patient was identified by name and date of birth  CICI Pickard was informed that this is a telemedicine visit and that the visit is being conducted through [address] [place] [address] Now and patient was informed that this is a secure, HIPAA-compliant platform  She agrees to proceed     My office door was closed  No one else was in the room  She acknowledged consent and understanding of privacy and security of the video platform  The patient has agreed to participate and understands they can discontinue the visit at any time  Patient is aware this is a billable service  Subjective  CICI Pickard is a 78 y o  female  With history of depression and anxiety   HPI   [facility] was seen for follow up MDD, ALECIA, insomnia and medication check    She notes she has been having more anxiety the past several weeks   She rates her depression as 3 or 4 out of 10 and anxiety a 4 or 5 out of 10    notes that she does not feel as though she is significantly depressed or anxious  No suicidal intention and no panic attacks  States that she has been having some difficulties readjusting to increased socialization now that restrictions are less  States that she is aware she needs to practice more self-awareness and meditation  Realizes that socialization has been a trigger for some of her anxiety  Her sleeping was diminished so she has been utilizing the clonazepam more often which has been helpful  Appetite is adequate  No new medications or medical issues were noted  Good supportive relationship with family and friends  Has many events coming up in the future that she is looking forward to with friends and family  Taking her medications as prescribed  No adverse effects noted with her medications  Past Medical History:   Diagnosis Date    Malignant hypertension     Last Assessed:  9/25/17       Past Surgical History:   Procedure Laterality Date    APPENDECTOMY  1990    HYSTERECTOMY         Current Outpatient Medications   Medication Sig Dispense Refill    cholecalciferol (VITAMIN D3) 1,000 units tablet Take 5 tablets by mouth daily        clonazePAM (KlonoPIN) 0 5 mg tablet 1/2- 1 po qhs prn 90 tablet 0    estradiol (ESTRACE) 0 5 MG tablet TAKE 1 TABLET DAILY 90 tablet 1    glucosamine-chondroitin 500-400 MG tablet Take 1 tablet by mouth 3 (three) times a day      Levothyroxine Sodium 75 MCG CAPS Take 1 capsule (75 mcg total) by mouth daily 90 capsule 0    magnesium 30 MG tablet Take 30 mg by mouth 2 (two) times a day      Multiple Vitamins-Minerals (MULTIVITAMIN ADULT PO) Take 1 tablet by mouth daily      tranylcypromine (PARNATE) 10 mg tablet TAKE 2 TABLETS TWICE A  tablet 0     No current facility-administered medications for this visit          Allergies   Allergen Reactions    Percocet [Oxycodone-Acetaminophen] Rash, Hives and Itching     As per patient       Review of Systems   As noted in HPI    Video Exam    There were no vitals filed for this visit  Physical Exam   MSE:  Good hygiene, good eye contact  Speech is clear and coherent, normal rate and volume  Affect is appropriate, mood is anxious, dysthymic at times  Linear and coherent thought process  No suicidal or homicidal ideation  No auditory or visual hallucinations  Insight and judgement intact  Cognition intact    Continue with medications and treatment plan as follows:  Clonazepam 0 5 mg half to one at bedtime as needed   Parnate  10 mg two in the morning and two at night  Will follow-up in four months she will call sooner if any questions or concerns  She is aware of dietary restrictions related to taking MAOI medication    I spent 30 minutes directly with the patient during this visit      VIRTUAL VISIT DISCLAIMER    S Yang Rebeccakumar acknowledges that she has consented to an online visit or consultation  She understands that the online visit is based solely on information provided by her, and that, in the absence of a face-to-face physical evaluation by the physician, the diagnosis she receives is both limited and provisional in terms of accuracy and completeness  This is not intended to replace a full medical face-to-face evaluation by the physician  Obdulio Smith understands and accepts these terms

## 2021-10-25 ENCOUNTER — OFFICE VISIT (OUTPATIENT)
Dept: PSYCHIATRY | Facility: CLINIC | Age: 66
End: 2021-10-25
Payer: MEDICARE

## 2021-10-25 DIAGNOSIS — F41.1 GENERALIZED ANXIETY DISORDER: ICD-10-CM

## 2021-10-25 DIAGNOSIS — F51.01 PRIMARY INSOMNIA: ICD-10-CM

## 2021-10-25 DIAGNOSIS — F33.40 RECURRENT MAJOR DEPRESSIVE DISORDER, IN REMISSION (HCC): Primary | ICD-10-CM

## 2021-10-25 PROCEDURE — 99214 OFFICE O/P EST MOD 30 MIN: CPT | Performed by: PHYSICIAN ASSISTANT

## 2022-02-16 ENCOUNTER — OFFICE VISIT (OUTPATIENT)
Dept: PSYCHIATRY | Facility: CLINIC | Age: 67
End: 2022-02-16
Payer: MEDICARE

## 2022-02-16 VITALS — HEIGHT: 64 IN | WEIGHT: 151 LBS | BODY MASS INDEX: 25.78 KG/M2

## 2022-02-16 DIAGNOSIS — F41.1 GENERALIZED ANXIETY DISORDER: ICD-10-CM

## 2022-02-16 DIAGNOSIS — F33.40 RECURRENT MAJOR DEPRESSIVE DISORDER, IN REMISSION (HCC): Primary | ICD-10-CM

## 2022-02-16 DIAGNOSIS — F51.01 PRIMARY INSOMNIA: ICD-10-CM

## 2022-02-16 PROCEDURE — 99214 OFFICE O/P EST MOD 30 MIN: CPT | Performed by: PHYSICIAN ASSISTANT

## 2022-02-16 RX ORDER — TRANYLCYPROMINE 10 MG/1
TABLET ORAL
Qty: 270 TABLET | Refills: 0
Start: 2022-02-16 | End: 2022-03-21

## 2022-02-16 NOTE — PSYCH
PROGRESS NOTE        [address] [facility]      Name and Date of Birth:  Santosh Victor 75 y o  1955    Date of Visit: 02/16/22    SUBJECTIVE:  Daksha Tang was seen for follow-up of depression, anxiety and for medication management  Overall reports that she has been doing very well  She is managing and handling things well  Working on giving herself more credit  She has good interest and motivation  No significant depressive episodes or anxiety attacks  Feels as though her moods have been stable and she is doing well  She enjoys painting, exercising and meditating  Sleeping fairly well and appetite is good  Maintaining her weight  Has been off klonopin for several months  She denies suicidal ideation, intent or plan at present, has no suicidal ideation, intent or plan at present  She denies any auditory hallucinations and visual hallucinations, denies any other delusional thinking, denies any delusional thinking  She denies any side effects from medications    HPI ROS Appetite Changes and Sleep: normal appetite, normal sleep    Review Of Systems:      Constitutional Negative   ENT Negative   Cardiovascular Negative   Respiratory Negative   Gastrointestinal Negative   Genitourinary Negative   Musculoskeletal Negative   Integumentary Negative   Neurological Negative   Endocrine Negative   Other Symptoms Negative and None       Laboratory Results: No results found for this or any previous visit      Substance Abuse History:    Social History     Substance and Sexual Activity   Drug Use No       Family Psychiatric History:     Family History   Problem Relation Age of Onset    Pancreatic cancer Mother     Depression Mother         Recurrent    Depression Father         Recurrent    Other Father         Anxiety       The following portions of the patient's history were reviewed and updated as appropriate: past family history, past medical history, past social history, past surgical history and problem list     Social History     Socioeconomic History    Marital status: /Civil Union     Spouse name: Not on file    Number of children: Not on file    Years of education: Not on file    Highest education level: Not on file   Occupational History     Comment: Currently works full-time   Tobacco Use    Smoking status: Never Smoker    Smokeless tobacco: Never Used   Substance and Sexual Activity    Alcohol use: No    Drug use: No    Sexual activity: Not on file   Other Topics Concern    Not on file   Social History Narrative    Not on file     Social Determinants of Health     Financial Resource Strain: Not on file   Food Insecurity: Not on file   Transportation Needs: Not on file   Physical Activity: Not on file   Stress: Not on file   Social Connections: Not on file   Intimate Partner Violence: Not on file   Housing Stability: Not on file     Social History     Social History Narrative    Not on file        Social History     Tobacco History     Smoking Status  Never Smoker    Smokeless Tobacco Use  Never Used          Alcohol History     Alcohol Use Status  No          Drug Use     Drug Use Status  No          Sexual Activity     Sexually Active  Not Asked          Activities of Daily Living    Not Asked                     OBJECTIVE:     Mental Status Evaluation:    Appearance age appropriate, casually dressed   Behavior pleasant, cooperative   Speech normal volume, normal pitch, conversive   Mood Less anxious, euthymic   Affect appropriate   Thought Processes circumstantial   Associations intact associations   Thought Content normal   Perceptual Disturbances: none   Abnormal Thoughts  Risk Potential Suicidal ideation - None  Homicidal ideation - None  Potential for aggression - No   Orientation oriented to person, place, time/date and situation   Memory recent and remote memory grossly intact   Cosciousness alert and awake   Attention Span attention span and concentration are age appropriate   Intellect Not formally assessed   Insight age appropriate    Judgement good    Muscle Strength and  Gait Steady gait   Language no difficulty naming common objects   Fund of Knowledge displays adequate knowledge of current events   Pain none   Pain Scale 0       Assessment/Plan:       Diagnoses and all orders for this visit:    Recurrent major depressive disorder, in remission ([facility] [address] )    Generalized anxiety disorder    Primary insomnia          Treatment Recommendations/Precautions:  Parnate 10 mg two in the morning and two at night   She would like to trial medication reduction   Since she has been stable will trial decrease to 10 mg po qam and continue with 20 mg po qhs  Aware of dietary restrictions due to taken MAOI medication and aware of potential hypertensive crisis    Risks/Benefits      Risks, Benefits And Possible Side Effects Of Medications:    Risks, benefits, and possible side effects of medications explained to patient and patient verbalizes understanding and agreement for treatment      Controlled Medication Discussion:     Not applicable    Psychotherapy Provided:     Individual psychotherapy provided: No

## 2022-02-16 NOTE — BH TREATMENT PLAN
TREATMENT PLAN (Medication Management Only)        [facility]    Name and Date of Birth:  Lux Pacheco 75 y o  1955  Date of Treatment Plan: February 16, 2022  Diagnosis/Diagnoses:    1  Recurrent major depressive disorder, in remission ([facility] [address] )    2  Generalized anxiety disorder    3  Primary insomnia      Strengths/Personal Resources for Self-Care: supportive family and self  Area/Areas of need (in own words): "reducing my reactivity with my "  1  Long Term Goal: maintain acceptable anxiety level  Target Date:6 months - 8/16/2022  Person/Persons responsible for completion of goal: CICI Vasquez  2  Short Term Objective (s) - How will we reach this goal?:   A  Provider new recommended medication/dosage changes and/or continue medication(s): trial GDR of parnate  B  N/A   C  N/A  Target Date:6 months - 8/16/2022  Person/Persons Responsible for Completion of Goal: CICI Vasquez  Progress Towards Goals: stable  Treatment Modality: medication management every 4 months  Review due 180 days from date of this plan: 6 months - 8/16/2022  Expected length of service: ongoing treatment  My Physician/PA/NP and I have developed this plan together and I agree to work on the goals and objectives  I understand the treatment goals that were developed for my treatment

## 2022-03-11 ENCOUNTER — TELEPHONE (OUTPATIENT)
Dept: PSYCHIATRY | Facility: CLINIC | Age: 67
End: 2022-03-11

## 2022-03-11 NOTE — TELEPHONE ENCOUNTER
Spoke With YOAV regarding medications  At her appointment in February we reduced Parnate from 4 10 mg tablets per day to three per day  States that she is feeling better now yesterday and today  Will continue with the three tabs per day for another week and re-evaluate    If irritability depression returns will go back to four tablets per day, in the morning and two at night

## 2022-03-11 NOTE — TELEPHONE ENCOUNTER
Christina noticed this week, trouble sleeping, and also thinks her medicine may be too low  Been struggling emotionally, more irritable

## 2022-03-18 ENCOUNTER — TELEPHONE (OUTPATIENT)
Dept: PSYCHIATRY | Facility: CLINIC | Age: 67
End: 2022-03-18

## 2022-03-18 NOTE — TELEPHONE ENCOUNTER
Christina decided to increase her antidepressant this week   Says if you have any concerns feel free to call her

## 2022-03-21 DIAGNOSIS — F33.40 RECURRENT MAJOR DEPRESSIVE DISORDER, IN REMISSION (HCC): ICD-10-CM

## 2022-03-21 RX ORDER — TRANYLCYPROMINE 10 MG/1
TABLET ORAL
Qty: 270 TABLET | Refills: 0
Start: 2022-03-21 | End: 2022-06-03 | Stop reason: SDUPTHER

## 2022-05-25 ENCOUNTER — TELEPHONE (OUTPATIENT)
Dept: PSYCHIATRY | Facility: CLINIC | Age: 67
End: 2022-05-25

## 2022-05-25 NOTE — TELEPHONE ENCOUNTER
Kobe Shaikh has an appt in 3 weeks   She left a voicemail that she's been having some anxiety and depression

## 2022-06-02 ENCOUNTER — TELEPHONE (OUTPATIENT)
Dept: PSYCHIATRY | Facility: CLINIC | Age: 67
End: 2022-06-02

## 2022-06-02 NOTE — TELEPHONE ENCOUNTER
Renetta Arvizu is dealing with some anxiety this week  And wants to know if you can either call her or squeeze her in before the 16th

## 2022-06-03 ENCOUNTER — OFFICE VISIT (OUTPATIENT)
Dept: PSYCHIATRY | Facility: CLINIC | Age: 67
End: 2022-06-03
Payer: MEDICARE

## 2022-06-03 DIAGNOSIS — F41.1 GENERALIZED ANXIETY DISORDER: ICD-10-CM

## 2022-06-03 DIAGNOSIS — F33.40 RECURRENT MAJOR DEPRESSIVE DISORDER, IN REMISSION (HCC): Primary | ICD-10-CM

## 2022-06-03 DIAGNOSIS — F51.01 PRIMARY INSOMNIA: ICD-10-CM

## 2022-06-03 PROCEDURE — 99214 OFFICE O/P EST MOD 30 MIN: CPT | Performed by: PHYSICIAN ASSISTANT

## 2022-06-03 RX ORDER — TRANYLCYPROMINE 10 MG/1
TABLET ORAL
Qty: 360 TABLET | Refills: 1 | Status: SHIPPED | OUTPATIENT
Start: 2022-06-03

## 2022-06-03 RX ORDER — GABAPENTIN 300 MG/1
300 CAPSULE ORAL
Qty: 30 CAPSULE | Refills: 2 | Status: SHIPPED | OUTPATIENT
Start: 2022-06-03 | End: 2022-06-16

## 2022-06-03 NOTE — PSYCH
PROGRESS NOTE        Alexx Lambert      Name and Date of Birth:  Jason Lopez 75 y o  1955    Date of Visit: 06/03/22    SUBJECTIVE:  [facility] was seen for follow-up recurrent depression, anxiety, insomnia and for medication management  Seen for sooner appointment due to reports of increased anxiety and depression  At her last visit in February she was doing well and wanted to attempt med reduction in Parnate by 10 mg  She felt more depressed after about 3 weeks so dose increased back to 40 mg total per day  Reports her depression increased a few weeks ago  She is more anxious, tearful , feels weak  Has guilt feelings, low self esteem, sleep is disrupted, less motivation and less interest   Appetite is fair  She was tapered off estradiol in April which coincides with moods declining  She has been having hot flashes since being off this which are waking her up at night  Also states that she had tick bite compliance infection had been on 10 day course of doxycycline  Discussed that this may also potentially affect her mood  She denies suicidal ideation, intent or plan at present, has no suicidal ideation, intent or plan at present  She denies any auditory hallucinations and visual hallucinations, denies any other delusional thinking, denies any delusional thinking  She denies any side effects from medications    HPI ROS Appetite Changes and Sleep:  Fair appetite, decreased sleep    Review Of Systems:      Constitutional Negative   ENT Negative   Cardiovascular Negative   Respiratory Negative   Gastrointestinal Negative   Genitourinary Negative   Musculoskeletal Negative   Integumentary Negative   Neurological Negative   Endocrine Negative   Other Symptoms Negative and None       Laboratory Results: No results found for this or any previous visit      Substance Abuse History:    Social History     Substance and Sexual Activity   Drug Use No Family Psychiatric History:     Family History   Problem Relation Age of Onset    Pancreatic cancer Mother     Depression Mother         Recurrent    Depression Father         Recurrent    Other Father         Anxiety       The following portions of the patient's history were reviewed and updated as appropriate: past family history, past medical history, past social history, past surgical history and problem list     Social History     Socioeconomic History    Marital status: /Civil Union     Spouse name: Not on file    Number of children: Not on file    Years of education: Not on file    Highest education level: Not on file   Occupational History     Comment: Currently works full-time   Tobacco Use    Smoking status: Never Smoker    Smokeless tobacco: Never Used   Substance and Sexual Activity    Alcohol use: No    Drug use: No    Sexual activity: Not on file   Other Topics Concern    Not on file   Social History Narrative    Not on file     Social Determinants of Health     Financial Resource Strain: Not on file   Food Insecurity: Not on file   Transportation Needs: Not on file   Physical Activity: Not on file   Stress: Not on file   Social Connections: Not on file   Intimate Partner Violence: Not on file   Housing Stability: Not on file     Social History     Social History Narrative    Not on file        Social History     Tobacco History     Smoking Status  Never Smoker    Smokeless Tobacco Use  Never Used          Alcohol History     Alcohol Use Status  No          Drug Use     Drug Use Status  No          Sexual Activity     Sexually Active  Not Asked          Activities of Daily Living    Not Asked                     OBJECTIVE:     Mental Status Evaluation:    Appearance age appropriate, casually dressed   Behavior Calm, cooperative   Speech normal volume, normal pitch   Mood Anxious   Affect Congruent   Thought Processes Circumstantial   Associations intact associations Thought Content Preoccupied   Perceptual Disturbances: none   Abnormal Thoughts  Risk Potential Suicidal ideation - None  Homicidal ideation - None  Potential for aggression - No   Orientation oriented to person, place, time/date and situation   Memory recent and remote memory grossly intact   Cosciousness alert and awake   Attention Span attention span and concentration are age appropriate   Intellect Not formally assessed   Insight age appropriate    Judgement good    Muscle Strength and  Gait Steady gait   Language no difficulty naming common objects   Fund of Knowledge displays adequate knowledge of current events   Pain none   Pain Scale 0       Assessment/Plan:       Diagnoses and all orders for this visit:    Recurrent major depressive disorder, in remission ([facility] [address] )  -     gabapentin (Neurontin) 300 mg capsule; Take 1 capsule (300 mg total) by mouth daily at bedtime  -     tranylcypromine (PARNATE) 10 mg tablet; 2 po qam and 2 po qhs    Generalized anxiety disorder  -     gabapentin (Neurontin) 300 mg capsule; Take 1 capsule (300 mg total) by mouth daily at bedtime    Primary insomnia  -     gabapentin (Neurontin) 300 mg capsule; Take 1 capsule (300 mg total) by mouth daily at bedtime          Treatment Recommendations/Precautions:  Add gabapentin 300 mg at bedtime for anxiety, hot flashes and sleep  Continue Parnate 10 mg two in the morning two at night  MAOI diet   Will also consider resuming estradiol due to change in mood correlating with discontinuation of this medication   Has p r n  clonazepam 0 5 mg that she is taking half to one as needed  Will follow-up in two weeks as previously scheduled and she will call me sooner if any questions or concerns        Risks/Benefits      Risks, Benefits And Possible Side Effects Of Medications:    Risks, benefits, and possible side effects of medications explained to patient and patient verbalizes understanding and agreement for treatment      Controlled Medication Discussion:   PDMP reviewed    Psychotherapy Provided:     Individual psychotherapy provided: No

## 2022-06-16 ENCOUNTER — OFFICE VISIT (OUTPATIENT)
Dept: PSYCHIATRY | Facility: CLINIC | Age: 67
End: 2022-06-16
Payer: MEDICARE

## 2022-06-16 DIAGNOSIS — F33.41 MDD (MAJOR DEPRESSIVE DISORDER), RECURRENT, IN PARTIAL REMISSION (HCC): Primary | ICD-10-CM

## 2022-06-16 DIAGNOSIS — F51.01 PRIMARY INSOMNIA: ICD-10-CM

## 2022-06-16 DIAGNOSIS — F41.1 GENERALIZED ANXIETY DISORDER: ICD-10-CM

## 2022-06-16 PROCEDURE — 99214 OFFICE O/P EST MOD 30 MIN: CPT | Performed by: PHYSICIAN ASSISTANT

## 2022-06-16 RX ORDER — GABAPENTIN 300 MG/1
CAPSULE ORAL
Qty: 30 CAPSULE | Refills: 2
Start: 2022-06-16

## 2022-06-16 RX ORDER — CLONAZEPAM 0.5 MG/1
0.25 TABLET ORAL
Qty: 30 TABLET | Refills: 0
Start: 2022-06-16

## 2022-06-16 NOTE — PSYCH
PROGRESS NOTE        [place]      Name and Date of Birth:  Mayo Fritz 75 y o  1955    Date of Visit: 06/16/22    SUBJECTIVE:  [place] was seen for follow-up of major recurrent depression, anxiety, insomnia and for medication management  States that overall she has been feeling much better  States her anxiety was at an 8 out 10 2 weeks ago and now is a 2 out of 10  She took gabapentin 300 at has for a week which helped with hot flashes  She is back on estradiol and this has helped with her mood  Has been taking klonopin 0 25 mg at night and parnate 10 mg 2 po bid  Motivation and interest is improved  States that she is looking forward to things again  She has been sleeping well at night  Appetite is adequate  Good relationship with her spouse and family  They have a an upcoming trip to the beach for the end of the month  States that two weeks ago she did not even want to go and now she is very much looking forward to this  Continues to work with her therapist     She denies suicidal ideation, intent or plan at present, has no suicidal ideation, intent or plan at present  She denies any auditory hallucinations and visual hallucinations, denies any other delusional thinking, denies any delusional thinking  She denies any side effects from medications    HPI ROS Appetite Changes and Sleep: normal appetite, normal sleep    Review Of Systems:      Constitutional Negative   ENT Negative   Cardiovascular Negative   Respiratory Negative   Gastrointestinal Negative   Genitourinary Negative   Musculoskeletal Negative   Integumentary Negative   Neurological Negative   Endocrine Negative   Other Symptoms Negative and None       Laboratory Results: No results found for this or any previous visit      Substance Abuse History:    Social History     Substance and Sexual Activity   Drug Use No       Family Psychiatric History:     Family History   Problem Relation Age of Onset    Pancreatic cancer Mother     Depression Mother         Recurrent    Depression Father         Recurrent    Other Father         Anxiety       The following portions of the patient's history were reviewed and updated as appropriate: past family history, past medical history, past social history, past surgical history and problem list     Social History     Socioeconomic History    Marital status: /Civil Union     Spouse name: Not on file    Number of children: Not on file    Years of education: Not on file    Highest education level: Not on file   Occupational History     Comment: Currently works full-time   Tobacco Use    Smoking status: Never Smoker    Smokeless tobacco: Never Used   Substance and Sexual Activity    Alcohol use: No    Drug use: No    Sexual activity: Not on file   Other Topics Concern    Not on file   Social History Narrative    Not on file     Social Determinants of Health     Financial Resource Strain: Not on file   Food Insecurity: Not on file   Transportation Needs: Not on file   Physical Activity: Not on file   Stress: Not on file   Social Connections: Not on file   Intimate Partner Violence: Not on file   Housing Stability: Not on file     Social History     Social History Narrative    Not on file        Social History     Tobacco History     Smoking Status  Never Smoker    Smokeless Tobacco Use  Never Used          Alcohol History     Alcohol Use Status  No          Drug Use     Drug Use Status  No          Sexual Activity     Sexually Active  Not Asked          Activities of Daily Living    Not Asked                     OBJECTIVE:     Mental Status Evaluation:    Appearance age appropriate, casually dressed   Behavior pleasant, cooperative   Speech normal volume, talkative, normal pitch   Mood euthymic   Affect congruent   Thought Processes logical   Associations intact associations   Thought Content normal   Perceptual Disturbances: none   Abnormal Thoughts  Risk Potential Suicidal ideation - None  Homicidal ideation - None  Potential for aggression - No   Orientation oriented to person, place, time/date and situation   Memory recent and remote memory grossly intact   Cosciousness alert and awake   Attention Span attention span and concentration are age appropriate   Intellect Not formally assessed   Insight age appropriate    Judgement good    Muscle Strength and  Gait muscle strength and tone were normal   Language no difficulty naming common objects   Fund of Knowledge displays adequate knowledge of current events   Pain none   Pain Scale 0       Assessment/Plan:       Diagnoses and all orders for this visit:    MDD (major depressive disorder), recurrent, in partial remission (HCC)  -     gabapentin (Neurontin) 300 mg capsule; 1 po qhs prn  -     clonazePAM (KlonoPIN) 0 5 mg tablet; Take 0 5 tablets (0 25 mg total) by mouth daily at bedtime 1/2 po qhs prn    Generalized anxiety disorder  -     gabapentin (Neurontin) 300 mg capsule; 1 po qhs prn  -     clonazePAM (KlonoPIN) 0 5 mg tablet; Take 0 5 tablets (0 25 mg total) by mouth daily at bedtime 1/2 po qhs prn    Primary insomnia  -     gabapentin (Neurontin) 300 mg capsule; 1 po qhs prn          Treatment Recommendations/Precautions:  Gabapentin 300 mg at bedtime - change to prn insomnia/hot flashes  Parnate 10 mg two morning two at night  Clonazepam 0 5 mg half a tablet at hs-discussed trying to change back to as needed in July    Follow up 3 months and she will call me sooner if any questions or concerns    Risks/Benefits      Risks, Benefits And Possible Side Effects Of Medications:    Risks, benefits, and possible side effects of medications explained to patient and patient verbalizes understanding and agreement for treatment      Controlled Medication Discussion:     Taking as prescribed, PDMP reviewed  Last rx filled for klonopin in June 2021  Psychotherapy Provided: Individual psychotherapy provided: No

## 2022-09-22 ENCOUNTER — OFFICE VISIT (OUTPATIENT)
Dept: PSYCHIATRY | Facility: CLINIC | Age: 67
End: 2022-09-22
Payer: MEDICARE

## 2022-09-22 DIAGNOSIS — F33.41 MDD (MAJOR DEPRESSIVE DISORDER), RECURRENT, IN PARTIAL REMISSION (HCC): Primary | ICD-10-CM

## 2022-09-22 DIAGNOSIS — F51.01 PRIMARY INSOMNIA: ICD-10-CM

## 2022-09-22 DIAGNOSIS — F41.1 GENERALIZED ANXIETY DISORDER: ICD-10-CM

## 2022-09-22 PROCEDURE — 99214 OFFICE O/P EST MOD 30 MIN: CPT | Performed by: PHYSICIAN ASSISTANT

## 2022-09-22 NOTE — PSYCH
PROGRESS NOTE        [facility]      Name and Date of Birth:  Bing Lara 75 y o  1955    Date of Visit: 09/22/22    SUBJECTIVE:  Pawel Ren was seen for follow-up of major recurrent depression, anxiety, insomnia for medication management  She appears in good spirits and feels much better then past couple months  She has residual anxiety which she states is part of her personality  Typically sleeps okay but occasionally has difficulty with falling asleep  Since being back on estrodiol she has been doing well  She needs a hip replacement so has been doing yoga but less walking  She has been painting and has a good support system  Appetite is good  No new medications or other medical issues noted  She denies suicidal ideation, intent or plan at present, has no suicidal ideation, intent or plan at present  She denies any auditory hallucinations and visual hallucinations, denies any other delusional thinking, denies any delusional thinking  She denies any side effects from medications    HPI ROS Appetite Changes and Sleep: normal appetite, normal sleep    Review Of Systems:      Constitutional Negative   ENT Negative   Cardiovascular Negative   Respiratory Negative   Gastrointestinal Negative   Genitourinary Negative   Musculoskeletal Negative   Integumentary Negative   Neurological Negative   Endocrine Negative   Other Symptoms Negative and None       Laboratory Results: No results found for this or any previous visit      Substance Abuse History:    Social History     Substance and Sexual Activity   Drug Use No       Family Psychiatric History:     Family History   Problem Relation Age of Onset    Pancreatic cancer Mother     Depression Mother         Recurrent    Depression Father         Recurrent    Other Father         Anxiety       The following portions of the patient's history were reviewed and updated as appropriate: past family history, past medical history, past social history, past surgical history and problem list     Social History     Socioeconomic History    Marital status: /Civil Union     Spouse name: Not on file    Number of children: Not on file    Years of education: Not on file    Highest education level: Not on file   Occupational History     Comment: Currently works full-time   Tobacco Use    Smoking status: Never Smoker    Smokeless tobacco: Never Used   Substance and Sexual Activity    Alcohol use: No    Drug use: No    Sexual activity: Not on file   Other Topics Concern    Not on file   Social History Narrative    Not on file     Social Determinants of Health     Financial Resource Strain: Not on file   Food Insecurity: Not on file   Transportation Needs: Not on file   Physical Activity: Not on file   Stress: Not on file   Social Connections: Not on file   Intimate Partner Violence: Not on file   Housing Stability: Not on file     Social History     Social History Narrative    Not on file        Social History     Tobacco History     Smoking Status  Never Smoker    Smokeless Tobacco Use  Never Used          Alcohol History     Alcohol Use Status  No          Drug Use     Drug Use Status  No          Sexual Activity     Sexually Active  Not Asked          Activities of Daily Living    Not Asked                     OBJECTIVE:     Mental Status Evaluation:    Appearance age appropriate, casually dressed   Behavior pleasant, cooperative   Speech normal volume, talkative, normal pitch   Mood euthymic   Affect congruent   Thought Processes gcircumstantial   Associations intact associations   Thought Content normal   Perceptual Disturbances: none   Abnormal Thoughts  Risk Potential Suicidal ideation - None  Homicidal ideation - None  Potential for aggression - No   Orientation oriented to person, place, time/date and situation   Memory recent and remote memory grossly intact   Cosciousness alert and awake Attention Span attention span and concentration are age appropriate   Intellect Not formally assessed   Insight age appropriate    Judgement good    Muscle Strength and  Gait muscle strength and tone were normal   Language no difficulty naming common objects   Fund of Knowledge displays adequate knowledge of current events   Pain none   Pain Scale 0       Assessment/Plan:       Diagnoses and all orders for this visit:    MDD (major depressive disorder), recurrent, in partial remission ([facility] [address] )    Generalized anxiety disorder    Primary insomnia          Treatment Recommendations/Precautions:  D/C Gabapentin 300 mg p r n  at bedtime     Parnate 10 mg two tabs in the morning and two at night    Clonazepam 0 5 mg a half to one at bedtime p r n  Follow-up in about three months she will call me sooner if any questions or concerns    Risks/Benefits      Risks, Benefits And Possible Side Effects Of Medications:    Risks, benefits, and possible side effects of medications explained to patient and patient verbalizes understanding and agreement for treatment      Controlled Medication Discussion:     PDMP reviewed      Psychotherapy Provided:     Individual psychotherapy provided: No

## 2022-09-22 NOTE — BH TREATMENT PLAN
TREATMENT PLAN (Medication Management Only)        [facility]    Name and Date of Birth:  Shukri Rocha 75 y o  1955  Date of Treatment Plan: September 22, 2022  Diagnosis/Diagnoses:    1  MDD (major depressive disorder), recurrent, in partial remission ([facility] [address] )    2  Generalized anxiety disorder    3  Primary insomnia      Strengths/Personal Resources for Self-Care: supportive family, friends, hobbies, yoga, painting  Area/Areas of need (in own words): "not beating myself abot things"  1  Long Term Goal: maintain control of anxiety  Target Date:6 months - 3/22/2023  Person/Persons responsible for completion of goal: CICI Vasquez  2  Short Term Objective (s) - How will we reach this goal?:   A  Provider new recommended medication/dosage changes and/or continue medication(s): continue current medications as prescribed  B  N/A   C  N/A  Target Date:6 months - 3/22/2023  Person/Persons Responsible for Completion of Goal: CICI Vasquez  Progress Towards Goals: stable  Treatment Modality: medication management every 6 months  Review due 180 days from date of this plan: 6 months - 3/22/2023  Expected length of service: ongoing treatment  My Physician/PA/NP and I have developed this plan together and I agree to work on the goals and objectives  I understand the treatment goals that were developed for my treatment

## 2022-12-29 ENCOUNTER — TELEPHONE (OUTPATIENT)
Dept: PSYCHIATRY | Facility: CLINIC | Age: 67
End: 2022-12-29

## 2022-12-29 NOTE — TELEPHONE ENCOUNTER
Pt called in needing to cancel and reschedule appt for 1/5/23 as this pt will be having knee surgery in the next few day  Please reach out to schedule accordingly

## 2023-03-20 ENCOUNTER — OFFICE VISIT (OUTPATIENT)
Dept: PSYCHIATRY | Facility: CLINIC | Age: 68
End: 2023-03-20

## 2023-03-20 DIAGNOSIS — F51.01 PRIMARY INSOMNIA: ICD-10-CM

## 2023-03-20 DIAGNOSIS — F41.1 GENERALIZED ANXIETY DISORDER: ICD-10-CM

## 2023-03-20 DIAGNOSIS — F33.40 RECURRENT MAJOR DEPRESSIVE DISORDER, IN REMISSION (HCC): Primary | ICD-10-CM

## 2023-03-20 RX ORDER — TRANYLCYPROMINE 10 MG/1
TABLET ORAL
Qty: 360 TABLET | Refills: 1 | Status: SHIPPED | OUTPATIENT
Start: 2023-03-20

## 2023-03-20 NOTE — BH TREATMENT PLAN
TREATMENT PLAN (Medication Management Only)        [facility]    Name and Date of Birth:  Elkin Muniz 70 y o  1955  Date of Treatment Plan: March 20, 2023  Diagnosis/Diagnoses:    1  MDD (major depressive disorder), recurrent, in partial remission ([facility] [address] )    2  Generalized anxiety disorder    3  Primary insomnia    4  Recurrent major depressive disorder, in remission [facility])      Strengths/Personal Resources for Self-Care: supportive family, supportive friends, hobbies, classes  Area/Areas of need (in own words): "bein more in the moment"  1  Long Term Goal: maintain control of anxiety  Target Date:6 months - 9/20/2023  Person/Persons responsible for completion of goal: CICI Vasquez  2  Short Term Objective (s) - How will we reach this goal?:   A  Provider new recommended medication/dosage changes and/or continue medication(s): continue current medications as prescribed  B  N/A   C  N/A  Target Date:6 months - 9/20/2023  Person/Persons Responsible for Completion of Goal: CICI Vasquez  Progress Towards Goals: stable  Treatment Modality: medication management every 6 months  Review due 180 days from date of this plan: 6 months - 9/20/2023  Expected length of service: maintenance  My Physician/PA/NP and I have developed this plan together and I agree to work on the goals and objectives  I understand the treatment goals that were developed for my treatment

## 2023-03-20 NOTE — PSYCH
PROGRESS NOTE        [facility]      Name and Date of Birth:  Deepak Valdes 70 y o  1955    Date of Visit: 03/20/23    SUBJECTIVE:  Sawyer Wade was seen for follow up MDD, ALECIA, insomnia and medication maangement  She had hip replacement surgery and recuperated well from that  Her brother-in-law passed away so she and her spouse went to [facility]  Unfortunately she got sick with Covid while there but has recuperated  Overall appears at her baseline with her moods  Appears bright and in good spirits  Continues with residual anxiety and worries  Can be self critical at times which we discussed  She does have lots of interests and hobbies  Has a good supportive spouse and family, friends  Has been taking a watercolor class for the first time which she is enjoying  Continues to take Parnate as prescribed and tolerating well  Aware of dietary restrictions  Rarely taking clonazepam at bedtime  Last prescription filled in 2021  She denies suicidal ideation, intent or plan at present, has no suicidal ideation, intent or plan at present  She denies any auditory hallucinations and visual hallucinations, denies any other delusional thinking, denies any delusional thinking  She denies any side effects from medications    HPI ROS Appetite Changes and Sleep: normal appetite, normal sleep    Review Of Systems:      Constitutional Negative   ENT Negative   Cardiovascular Negative   Respiratory Negative   Gastrointestinal Negative   Genitourinary Negative   Musculoskeletal Negative   Integumentary Negative   Neurological Negative   Endocrine Negative   Other Symptoms Negative and None       Laboratory Results: No results found for this or any previous visit      Substance Abuse History:    Social History     Substance and Sexual Activity   Drug Use No       Family Psychiatric History:     Family History   Problem Relation Age of Onset   • Pancreatic cancer Mother    • Depression Mother         Recurrent   • Depression Father         Recurrent   • Other Father         Anxiety       The following portions of the patient's history were reviewed and updated as appropriate: past family history, past medical history, past social history, past surgical history and problem list     Social History     Socioeconomic History   • Marital status: /Civil Union     Spouse name: Not on file   • Number of children: Not on file   • Years of education: Not on file   • Highest education level: Not on file   Occupational History     Comment: Currently works full-time   Tobacco Use   • Smoking status: Never   • Smokeless tobacco: Never   Substance and Sexual Activity   • Alcohol use: No   • Drug use: No   • Sexual activity: Not on file   Other Topics Concern   • Not on file   Social History Narrative   • Not on file     Social Determinants of Health     Financial Resource Strain: Not on file   Food Insecurity: Not on file   Transportation Needs: Not on file   Physical Activity: Not on file   Stress: Not on file   Social Connections: Not on file   Intimate Partner Violence: Not on file   Housing Stability: Not on file     Social History     Social History Narrative   • Not on file        Social History     Tobacco History     Smoking Status  Never    Smokeless Tobacco Use  Never          Alcohol History     Alcohol Use Status  No          Drug Use     Drug Use Status  No          Sexual Activity     Sexually Active  Not Asked          Activities of Daily Living    Not Asked                     OBJECTIVE:     Mental Status Evaluation:    Appearance age appropriate, casually dressed   Behavior pleasant, cooperative   Speech normal volume, normal pitch   Mood euthmic   Affect congruent   Thought Processes logical   Associations intact associations   Thought Content normal   Perceptual Disturbances: none   Abnormal Thoughts  Risk Potential Suicidal ideation - None  Homicidal ideation - None  Potential for aggression - No   Orientation oriented to person, place, time/date and situation   Memory recent and remote memory grossly intact   Cosciousness alert and awake   Attention Span attention span and concentration are age appropriate   Intellect Not formally assessed   Insight age appropriate    Judgement good    Muscle Strength and  Gait muscle strength and tone were normal   Language no difficulty naming common objects   Fund of Knowledge displays adequate knowledge of current events   Pain none   Pain Scale 0       Assessment/Plan:       Diagnoses and all orders for this visit:    Recurrent major depressive disorder, in remission ([facility] [address] )  -     tranylcypromine (PARNATE) 10 mg tablet; 2 po qam and 2 po qhs    Generalized anxiety disorder    Primary insomnia          Treatment Recommendations/Precautions:  Parnate 10 mg 2 po qam and 2 po qhs  clonazepam 0 5 mg 1/2- 1 po qhs prn - rarely taking  Last rx filled 6/2021    Continue current medications    Risks/Benefits      Risks, Benefits And Possible Side Effects Of Medications:    Risks, benefits, and possible side effects of medications explained to patient and patient verbalizes understanding and agreement for treatment      Controlled Medication Discussion:   PDMP reviewed    Psychotherapy Provided:     Individual psychotherapy provided: No

## 2023-07-20 ENCOUNTER — OFFICE VISIT (OUTPATIENT)
Dept: PSYCHIATRY | Facility: CLINIC | Age: 68
End: 2023-07-20
Payer: MEDICARE

## 2023-07-20 DIAGNOSIS — F51.01 PRIMARY INSOMNIA: ICD-10-CM

## 2023-07-20 DIAGNOSIS — F33.40 RECURRENT MAJOR DEPRESSIVE DISORDER, IN REMISSION (HCC): ICD-10-CM

## 2023-07-20 DIAGNOSIS — F41.1 GENERALIZED ANXIETY DISORDER: Primary | ICD-10-CM

## 2023-07-20 PROCEDURE — 99214 OFFICE O/P EST MOD 30 MIN: CPT | Performed by: PHYSICIAN ASSISTANT

## 2023-07-20 RX ORDER — TRANYLCYPROMINE 10 MG/1
TABLET ORAL
Qty: 360 TABLET | Refills: 1 | Status: SHIPPED | OUTPATIENT
Start: 2023-07-20

## 2023-07-20 NOTE — PSYCH
PROGRESS NOTE        [facility]      Name and Date of Birth:  Silvana Carney 70 y.o. 1955    Date of Visit: 07/20/23    SUBJECTIVE:  Emily Ansari was seen for follow-up of major depression, generalized anxiety, insomnia and for medication management. States she has been doing well overall. Tends to be more melancholy toward the end of summer which is her normal pattern. 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 Has been working on reframing her worry and has techniques she uses to bring herself more into the present. States that overall "things are pretty good". She is sleeping well and appetite is fair. Has good interest and motivation. Taking watercolor class which she enjoys. She denies suicidal ideation, intent or plan at present, has no suicidal ideation, intent or plan at present. She denies any auditory hallucinations and visual hallucinations, denies any other delusional thinking, denies any delusional thinking. She denies any side effects from medications  . HPI ROS Appetite Changes and Sleep: normal appetite, normal sleep    Review Of Systems:      Constitutional Negative   ENT Negative   Cardiovascular Negative   Respiratory Negative   Gastrointestinal Negative   Genitourinary Negative   Musculoskeletal Negative   Integumentary Negative   Neurological Negative   Endocrine Negative   Other Symptoms Negative and None       Laboratory Results: No results found for this or any previous visit.     Substance Abuse History:    Social History     Substance and Sexual Activity   Drug Use No       Family Psychiatric History:     Family History   Problem Relation Age of Onset   • Pancreatic cancer Mother    • Depression Mother         Recurrent   • Depression Father         Recurrent   • Other Father         Anxiety       The following portions of the patient's history were reviewed and updated as appropriate: past family history, past medical history, past social history, past surgical history and problem list.    Social History     Socioeconomic History   • Marital status: /Civil Union     Spouse name: Not on file   • Number of children: Not on file   • Years of education: Not on file   • Highest education level: Not on file   Occupational History     Comment: Currently works full-time   Tobacco Use   • Smoking status: Never   • Smokeless tobacco: Never   Substance and Sexual Activity   • Alcohol use: No   • Drug use: No   • Sexual activity: Not on file   Other Topics Concern   • Not on file   Social History Narrative   • Not on file     Social Determinants of Health     Financial Resource Strain: Not on file   Food Insecurity: Not on file   Transportation Needs: Not on file   Physical Activity: Not on file   Stress: Not on file   Social Connections: Not on file   Intimate Partner Violence: Not on file   Housing Stability: Not on file     Social History     Social History Narrative   • Not on file        Social History     Tobacco History     Smoking Status  Never    Smokeless Tobacco Use  Never          Alcohol History     Alcohol Use Status  No          Drug Use     Drug Use Status  No          Sexual Activity     Sexually Active  Not Asked          Activities of Daily Living    Not Asked                     OBJECTIVE:     Mental Status Evaluation:    Appearance age appropriate, casually dressed   Behavior pleasant, cooperative   Speech normal volume, normal pitch   Mood  euthymic   Affect  appropriate   Thought Processes logical   Associations intact associations   Thought Content normal   Perceptual Disturbances: none   Abnormal Thoughts  Risk Potential Suicidal ideation - None  Homicidal ideation - None  Potential for aggression - No   Orientation oriented to person, place, time/date and situation   Memory recent and remote memory grossly intact   Cosciousness alert and awake   Attention Span attention span and concentration are age appropriate   Intellect  not formally assessed   Insight age appropriate    Judgement good    Muscle Strength and  Gait  steady gait   Language no difficulty naming common objects   Fund of Knowledge displays adequate knowledge of current events   Pain none   Pain Scale 0       Assessment/Plan:       Diagnoses and all orders for this visit:    Generalized anxiety disorder    Recurrent major depressive disorder, in remission (HCC)  -     tranylcypromine (PARNATE) 10 mg tablet; 2 po qam and 2 po qhs    Primary insomnia          Treatment Recommendations/Precautions:  Parnate 10 mg 2 in the morning and 2 at night  Clonazepam 0.5 mg 1/2-1 at bedtime as needed, rarely taking  No record was found on PDMP of when she last got this filled. She will check at home and let me know if she needs a prescription that is more current  Follow up 4 months and she will call sooner if any questions or concerns    Continue current medications    Risks/Benefits      Risks, Benefits And Possible Side Effects Of Medications:    Risks, benefits, and possible side effects of medications explained to patient and patient verbalizes understanding and agreement for treatment.     Controlled Medication Discussion:     PDMP reviewed    Psychotherapy Provided:     Individual psychotherapy provided: No

## 2023-11-20 ENCOUNTER — OFFICE VISIT (OUTPATIENT)
Dept: PSYCHIATRY | Facility: CLINIC | Age: 68
End: 2023-11-20
Payer: MEDICARE

## 2023-11-20 DIAGNOSIS — F33.1 MDD (MAJOR DEPRESSIVE DISORDER), RECURRENT EPISODE, MODERATE (HCC): Primary | ICD-10-CM

## 2023-11-20 DIAGNOSIS — F41.1 GENERALIZED ANXIETY DISORDER: ICD-10-CM

## 2023-11-20 PROCEDURE — 99214 OFFICE O/P EST MOD 30 MIN: CPT | Performed by: PHYSICIAN ASSISTANT

## 2023-11-20 PROCEDURE — 90833 PSYTX W PT W E/M 30 MIN: CPT | Performed by: PHYSICIAN ASSISTANT

## 2023-11-20 RX ORDER — TRANYLCYPROMINE 10 MG/1
TABLET ORAL
Qty: 360 TABLET | Refills: 1 | Status: SHIPPED | OUTPATIENT
Start: 2023-11-20

## 2023-11-20 RX ORDER — CLONAZEPAM 0.5 MG/1
TABLET ORAL
Qty: 90 TABLET | Refills: 0 | Status: SHIPPED | OUTPATIENT
Start: 2023-11-20

## 2023-11-20 NOTE — PSYCH
This note was not shared with the patient due to reasonable likelihood of causing patient harm    PROGRESS NOTE        [facility]      Name and Date of Birth:  Travon Lr 70 y.o. 1955    Date of Visit: 11/20/23    SUBJECTIVE:  Lesia Celeste was seen for follow up major depression, ALECIA, insomnia and for medication management. She is feeling more anxious and more depressed. She is very tearful on exam today and appears visibly anxious, depressed. Has been feeling more overwhelmed and frustrated. States that she was having disrupted sleep for a couple months. Spouse noted that she was snoring. She was also having very vivid and violent dreams at times and yelling in her sleep. She was seen by her PCP who ordered a home sleep study. She has mild/moderate sleep apnea. She is very tearful discussing this and upset that she gained weight. Frustrated with trying to use CPAP and not being fitted in person. She has in person meeting with supply company next week. Not sleeping well at night over the past few weeks. Reports that her appetite is adequate but she is frustrated with weight gain. States that her weight has been fairly stable over the past year. He also has been more physically active. Has an apple watch which has been helpful in monitoring her activity. Medications reviewed and updated. Had not been taking clonazepam at night and discussed that she could take 0.25 mg to help with sleep/nighttime anxiety. She denies suicidal ideation, intent or plan at present, has no suicidal ideation, intent or plan at present. She denies any auditory hallucinations and visual hallucinations, denies any other delusional thinking, denies any delusional thinking. She denies any side effects from medications  .   HPI ROS Appetite Changes and Sleep: normal appetite, poor sleep    Review Of Systems:      Constitutional Negative   ENT Negative   Cardiovascular Negative   Respiratory Negative   Gastrointestinal Negative   Genitourinary Negative   Musculoskeletal Negative   Integumentary Negative   Neurological Negative   Endocrine Negative   Other Symptoms Negative and None       Laboratory Results: No results found for this or any previous visit.     Substance Abuse History:    Social History     Substance and Sexual Activity   Drug Use No       Family Psychiatric History:     Family History   Problem Relation Age of Onset    Pancreatic cancer Mother     Depression Mother         Recurrent    Depression Father         Recurrent    Other Father         Anxiety       The following portions of the patient's history were reviewed and updated as appropriate: past family history, past medical history, past social history, past surgical history and problem list.    Social History     Socioeconomic History    Marital status: /Civil Union     Spouse name: Not on file    Number of children: Not on file    Years of education: Not on file    Highest education level: Not on file   Occupational History     Comment: Currently works full-time   Tobacco Use    Smoking status: Never    Smokeless tobacco: Never   Substance and Sexual Activity    Alcohol use: No    Drug use: No    Sexual activity: Not on file   Other Topics Concern    Not on file   Social History Narrative    Not on file     Social Determinants of Health     Financial Resource Strain: Not on file   Food Insecurity: Not on file   Transportation Needs: Not on file   Physical Activity: Not on file   Stress: Not on file   Social Connections: Not on file   Intimate Partner Violence: Not on file   Housing Stability: Not on file     Social History     Social History Narrative    Not on file        Social History       Tobacco History       Smoking Status  Never      Smokeless Tobacco Use  Never              Alcohol History       Alcohol Use Status  No              Drug Use       Drug Use Status  No              Sexual Activity       Sexually Active  Not Asked              Activities of Daily Living    Not Asked                       OBJECTIVE:     Mental Status Evaluation:    Appearance age appropriate, casually dressed   Behavior pleasant, cooperative   Speech normal volume, normal pitch, rapid   Mood Tearful, depressed, anxious   Affect congruent   Thought Processes circumstantial   Associations intact associations   Thought Content normal   Perceptual Disturbances: none   Abnormal Thoughts  Risk Potential Suicidal ideation - None  Homicidal ideation - None  Potential for aggression - No   Orientation oriented to person, place, time/date and situation   Memory recent and remote memory grossly intact   Cosciousness alert and awake   Attention Span attention span and concentration are age appropriate   Intellect Not formally assessed   Insight age appropriate    Judgement good    Muscle Strength and  Gait muscle strength and tone were normal   Language no difficulty naming common objects   Fund of Knowledge displays adequate knowledge of current events   Pain none   Pain Scale 0       Assessment/Plan:       Diagnoses and all orders for this visit:    MDD (major depressive disorder), recurrent episode, moderate (HCC)  -     clonazePAM (KlonoPIN) 0.5 mg tablet; 1/2 po qhs prn  -     tranylcypromine (PARNATE) 10 mg tablet; 2 po qam and 2 po qhs    Generalized anxiety disorder  -     clonazePAM (KlonoPIN) 0.5 mg tablet; 1/2 po qhs prn          Treatment Recommendations/Precautions:  Parnate 10 mg 2 po qam and 2 po qhs  clonazepam 0.5 mg 1/2 -1 poq hs prn  Follow up about 4-6 weeks  Continue current medications    Risks/Benefits      Risks, Benefits And Possible Side Effects Of Medications:    Risks, benefits, and possible side effects of medications explained to patient and patient verbalizes understanding and agreement for treatment.     Controlled Medication Discussion:     Not applicable    Psychotherapy Provided:     Individual psychotherapy provided: yes x 20 min  Supportive psychotherapy focused on residual anxiety reduction, coping strategies with medical diagnoses  Start 200 end 230

## 2023-12-28 ENCOUNTER — OFFICE VISIT (OUTPATIENT)
Dept: PSYCHIATRY | Facility: CLINIC | Age: 68
End: 2023-12-28
Payer: MEDICARE

## 2023-12-28 DIAGNOSIS — F33.1 MDD (MAJOR DEPRESSIVE DISORDER), RECURRENT EPISODE, MODERATE (HCC): Primary | ICD-10-CM

## 2023-12-28 DIAGNOSIS — F41.1 GENERALIZED ANXIETY DISORDER: ICD-10-CM

## 2023-12-28 PROCEDURE — 90833 PSYTX W PT W E/M 30 MIN: CPT | Performed by: PHYSICIAN ASSISTANT

## 2023-12-28 PROCEDURE — 99214 OFFICE O/P EST MOD 30 MIN: CPT | Performed by: PHYSICIAN ASSISTANT

## 2023-12-28 RX ORDER — CLONAZEPAM 0.5 MG/1
TABLET ORAL
Qty: 45 TABLET | Refills: 0 | Status: SHIPPED | OUTPATIENT
Start: 2023-12-28

## 2023-12-28 NOTE — PSYCH
"Start 251, end 323  This note was not shared with the patient due to reasonable likelihood of causing patient harm      PROGRESS NOTE        [facility]      Name and Date of Birth:  CICI Quezada 68 y.o. 1955    Date of Visit: 12/28/23    SUBJECTIVE:  Christina was seen for follow-up of major depression, generalized anxiety, insomnia and for medication management.  States she is \"doing better than last time\".  She has been using the CPAP continuously the past few weeks and feeling better with that.  Continues with residual anxiety, worry and obsessive thoughts.  Has negative thoughts which we discussed and she responds well to supportive psychotherapy.  She has been able to recognize a lot of positive things over the past few weeks as well.  Enjoyed her time with her family over the holiday.  Signed up for meditation class starting in a few weeks.  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  Spouse and family are very good support.  Medications reviewed and updated.  Physically she is doing well.      She denies suicidal ideation, intent or plan at present, has no suicidal ideation, intent or plan at present.    She denies any auditory hallucinations and visual hallucinations, denies any other delusional thinking, denies any delusional thinking.    She denies any side effects from medications  .  HPI ROS Appetite Changes and Sleep: normal appetite, normal sleep    Review Of Systems:      Constitutional Negative   ENT Negative   Cardiovascular Negative   Respiratory Negative   Gastrointestinal Negative   Genitourinary Negative   Musculoskeletal Negative   Integumentary Negative   Neurological Negative   Endocrine Negative   Other Symptoms Negative and None       Laboratory Results: No results found for this or any previous visit.    Substance Abuse History:    Social History     Substance and Sexual Activity   Drug Use No       Family " Psychiatric History:     Family History   Problem Relation Age of Onset    Pancreatic cancer Mother     Depression Mother         Recurrent    Depression Father         Recurrent    Other Father         Anxiety       The following portions of the patient's history were reviewed and updated as appropriate: past family history, past medical history, past social history, past surgical history and problem list.    Social History     Socioeconomic History    Marital status: /Civil Union     Spouse name: Not on file    Number of children: Not on file    Years of education: Not on file    Highest education level: Not on file   Occupational History     Comment: Currently works full-time   Tobacco Use    Smoking status: Never    Smokeless tobacco: Never   Substance and Sexual Activity    Alcohol use: No    Drug use: No    Sexual activity: Not on file   Other Topics Concern    Not on file   Social History Narrative    Not on file     Social Determinants of Health     Financial Resource Strain: Low Risk  (12/30/2022)    Received from [facility]    Overall Financial Resource Strain (CARDIA)     Difficulty of Paying Living Expenses: Not hard at all   Food Insecurity: No Food Insecurity (12/30/2022)    Received from [facility]    Hunger Vital Sign     Worried About Running Out of Food in the Last Year: Never true     Ran Out of Food in the Last Year: Never true   Transportation Needs: No Transportation Needs (12/30/2022)    Received from [facility]    PRAPARE - Transportation     Lack of Transportation (Medical): No     Lack of Transportation (Non-Medical): No   Physical Activity: Not on file   Stress: Not on file   Social Connections: Not on file   Intimate Partner Violence: Not At Risk (12/30/2022)    Received from [facility]    Humiliation, Afraid, Rape, and Kick questionnaire     Fear of Current or Ex-Partner: No     Emotionally Abused: No      Physically Abused: No     Sexually Abused: No   Housing Stability: Low Risk  (12/30/2022)    Received from [facility]    Housing Stability Vital Sign     Unable to Pay for Housing in the Last Year: No     Number of Places Lived in the Last Year: 1     Unstable Housing in the Last Year: No     Social History     Social History Narrative    Not on file        Social History       Tobacco History       Smoking Status  Never      Smokeless Tobacco Use  Never              Alcohol History       Alcohol Use Status  No              Drug Use       Drug Use Status  No              Sexual Activity       Sexually Active  Not Asked              Activities of Daily Living    Not Asked                       OBJECTIVE:     Mental Status Evaluation:    Appearance age appropriate, casually dressed   Behavior pleasant, cooperative   Speech normal volume, normal pitch, circumstantial   Mood Less anxious, less depressed   Affect congruent   Thought Processes circumstantial   Associations intact associations   Thought Content Obsessive thoughts at times   Perceptual Disturbances: none   Abnormal Thoughts  Risk Potential Suicidal ideation - None  Homicidal ideation - None  Potential for aggression - No   Orientation oriented to person, place, time/date and situation   Memory recent and remote memory grossly intact   Cosciousness alert and awake   Attention Span attention span and concentration are age appropriate   Intellect Not formally assessed   Insight age appropriate    Judgement good    Muscle Strength and  Gait muscle strength and tone were normal   Language no difficulty naming common objects   Fund of Knowledge displays adequate knowledge of current events   Pain none   Pain Scale 0       Assessment/Plan:       Diagnoses and all orders for this visit:    MDD (major depressive disorder), recurrent episode, moderate (HCC)    Generalized anxiety disorder          Treatment Recommendations/Precautions:  Parnate 10  mg 2 in the morning and 2 at night  Clonazepam 0.5 mg 1/2-1 nightly as needed    Continue current medications    Risks/Benefits      Risks, Benefits And Possible Side Effects Of Medications:    Risks, benefits, and possible side effects of medications explained to patient and patient verbalizes understanding and agreement for treatment.    Controlled Medication Discussion:     Not applicable    Psychotherapy Provided:     Individual psychotherapy provided: yes x 20 min  CBT focused on questioning validity of negative thoughts

## 2024-03-28 ENCOUNTER — OFFICE VISIT (OUTPATIENT)
Dept: PSYCHIATRY | Facility: CLINIC | Age: 69
End: 2024-03-28

## 2024-03-28 DIAGNOSIS — F41.1 GENERALIZED ANXIETY DISORDER: ICD-10-CM

## 2024-03-28 DIAGNOSIS — F33.1 MDD (MAJOR DEPRESSIVE DISORDER), RECURRENT EPISODE, MODERATE (HCC): Primary | ICD-10-CM

## 2024-03-28 RX ORDER — CLONAZEPAM 0.5 MG/1
TABLET ORAL
Qty: 45 TABLET | Refills: 0 | Status: SHIPPED | OUTPATIENT
Start: 2024-03-28

## 2024-03-28 RX ORDER — OMEGA-3S/DHA/EPA/FISH OIL/D3 300MG-1000
400 CAPSULE ORAL DAILY
COMMUNITY

## 2024-03-28 RX ORDER — SACCHAROMYCES BOULARDII 250 MG
250 CAPSULE ORAL 2 TIMES DAILY
COMMUNITY

## 2024-03-28 RX ORDER — ZINC SULFATE 50(220)MG
220 CAPSULE ORAL DAILY
COMMUNITY

## 2024-03-28 NOTE — BH TREATMENT PLAN
"TREATMENT PLAN (Medication Management Only)        [facility]    Name and Date of Birth:  CICI Quezada 69 y.o. 1955  Date of Treatment Plan: March 28, 2024  Diagnosis/Diagnoses:    1. MDD (major depressive disorder), recurrent episode, moderate (HCC)    2. Generalized anxiety disorder      Strengths/Personal Resources for Self-Care: supportive family, supportive friends, hobbies  Area/Areas of need (in own words): \"get back into self care\"  1. Long Term Goal: maintain mood stability.  Target Date:6 months - 9/28/2024  Person/Persons responsible for completion of goal: CICI Vasquez  2.  Short Term Objective (s) - How will we reach this goal?:   A. Provider new recommended medication/dosage changes and/or continue medication(s): continue current medications as prescribed.  B. N/A.  C. N/A.  Target Date:6 months - 9/28/2024  Person/Persons Responsible for Completion of Goal: CICI Vasquez  Progress Towards Goals: stable  Treatment Modality: medication management every 4 months  Review due 180 days from date of this plan: 6 months - 9/28/2024  Expected length of service: maintenance  My Physician/PA/NP and I have developed this plan together and I agree to work on the goals and objectives. I understand the treatment goals that were developed for my treatment.      "

## 2024-03-28 NOTE — PSYCH
Time start 125  End 153  This note was not shared with the patient due to reasonable likelihood of causing patient harm   PROGRESS NOTE        [facility]      Name and Date of Birth:  CICI Quezada 69 y.o. 1955    Date of Visit: 03/28/24    SUBJECTIVE:  Christina was seen for follow-up of major depression, generalized anxiety and for medication management.  States she was ill with Covid earlier this year and then had a subsequent sinus infection.  States this past weekend is the first time she got out and felt back to her baseline physically.  She was feeling more vulnerable with being ill and turning 69 this year.  She completed PHQ9 and GAD7 scales last week when she was not feeling well physically so had more naxiety and depression.  She has been getting back into self-care/ massage.  She volunteers at hospice and animal shelter. Planning to reduce some of her commitments so she does not feel so overwhelmed.  Overall currently appears at her baseline with her moods and current medications.  No adverse effects noted.  Not needing Klonopin every night.  Medication list was reviewed and updated.  Has been more consistent with taking some supplements which were adequate.      She denies suicidal ideation, intent or plan at present, has no suicidal ideation, intent or plan at present.    She denies any auditory hallucinations and visual hallucinations, denies any other delusional thinking, denies any delusional thinking.    She denies any side effects from medications  .  HPI ROS Appetite Changes and Sleep: normal appetite, normal sleep    Review Of Systems:      Constitutional Negative   ENT Negative   Cardiovascular Negative   Respiratory Negative   Gastrointestinal Negative   Genitourinary Negative   Musculoskeletal Negative   Integumentary Negative   Neurological Negative   Endocrine Negative   Other Symptoms Negative and None       Laboratory Results: No results  found for this or any previous visit.    Substance Abuse History:    Social History     Substance and Sexual Activity   Drug Use No       Family Psychiatric History:     Family History   Problem Relation Age of Onset    Pancreatic cancer Mother     Depression Mother         Recurrent    Depression Father         Recurrent    Other Father         Anxiety       The following portions of the patient's history were reviewed and updated as appropriate: past family history, past medical history, past social history, past surgical history and problem list.    Social History     Socioeconomic History    Marital status: /Civil Union     Spouse name: Not on file    Number of children: Not on file    Years of education: Not on file    Highest education level: Not on file   Occupational History     Comment: Currently works full-time   Tobacco Use    Smoking status: Never    Smokeless tobacco: Never   Substance and Sexual Activity    Alcohol use: No    Drug use: No    Sexual activity: Not on file   Other Topics Concern    Not on file   Social History Narrative    Not on file     Social Determinants of Health     Financial Resource Strain: Low Risk  (12/30/2022)    Received from [facility]    Overall Financial Resource Strain (CARDIA)     Difficulty of Paying Living Expenses: Not hard at all   Food Insecurity: No Food Insecurity (12/30/2022)    Received from [facility]    Hunger Vital Sign     Worried About Running Out of Food in the Last Year: Never true     Ran Out of Food in the Last Year: Never true   Transportation Needs: No Transportation Needs (12/30/2022)    Received from [facility]    PRAPARE - Transportation     Lack of Transportation (Medical): No     Lack of Transportation (Non-Medical): No   Physical Activity: Not on file   Stress: Not on file   Social Connections: Not on file   Intimate Partner Violence: Not At Risk (12/30/2022)    Received from [place]  [facility]    Humiliation, Afraid, Rape, and Kick questionnaire     Fear of Current or Ex-Partner: No     Emotionally Abused: No     Physically Abused: No     Sexually Abused: No   Housing Stability: Low Risk  (12/30/2022)    Received from [facility]    Housing Stability Vital Sign     Unable to Pay for Housing in the Last Year: No     Number of Places Lived in the Last Year: 1     Unstable Housing in the Last Year: No     Social History     Social History Narrative    Not on file        Social History       Tobacco History       Smoking Status  Never      Smokeless Tobacco Use  Never              Alcohol History       Alcohol Use Status  No              Drug Use       Drug Use Status  No              Sexual Activity       Sexually Active  Not Asked              Activities of Daily Living    Not Asked                       OBJECTIVE:     Mental Status Evaluation:    Appearance age appropriate, casually dressed   Behavior pleasant, cooperative   Speech normal volume, normal pitch, circumstantial   Mood Currently euthymic   Affect Congruent   Thought Processes circumstantial   Associations intact associations   Thought Content normal   Perceptual Disturbances: none   Abnormal Thoughts  Risk Potential Suicidal ideation - None  Homicidal ideation - None  Potential for aggression - No   Orientation oriented to person, place, time/date and situation   Memory recent and remote memory grossly intact   Cosciousness alert and awake   Attention Span attention span and concentration are age appropriate   Intellect Not formally assessed   Insight age appropriate    Judgement good    Muscle Strength and  Gait muscle strength and tone were normal   Language no difficulty naming common objects   Fund of Knowledge displays adequate knowledge of current events   Pain none   Pain Scale 0       Assessment/Plan:       Diagnoses and all orders for this visit:    MDD (major depressive disorder), recurrent  episode, moderate (HCC)  -     clonazePAM (KlonoPIN) 0.5 mg tablet; 1/2 po qhs prn    Generalized anxiety disorder  -     clonazePAM (KlonoPIN) 0.5 mg tablet; 1/2 po qhs prn    Other orders  -     cholecalciferol (VITAMIN D3) 400 units tablet; Take 400 Units by mouth daily  -     zinc sulfate (ZINCATE) 220 mg capsule; Take 220 mg by mouth daily  -     saccharomyces boulardii (FLORASTOR) 250 mg capsule; Take 250 mg by mouth 2 (two) times a day  -     Omega 3-6-9 Fatty Acids (OMEGA 3-6-9 PO); Take by mouth          Treatment Recommendations/Precautions:  Parnate 10 mg 2 in the morning and 2 at night  Clonazepam 0.5 mg 1/2-1 nightly as needed    Continue current medications and follow-up in 4 months  She will call me sooner if any questions or concerns  Aware of potential for adverse effects with above medications and has been taking as prescribed    Risks/Benefits      Risks, Benefits And Possible Side Effects Of Medications:    Risks, benefits, and possible side effects of medications explained to patient and patient verbalizes understanding and agreement for treatment.    Controlled Medication Discussion:     PDMP reviewed    Psychotherapy Provided:     Individual psychotherapy provided: No

## 2024-07-19 DIAGNOSIS — F33.1 MDD (MAJOR DEPRESSIVE DISORDER), RECURRENT EPISODE, MODERATE (HCC): ICD-10-CM

## 2024-07-22 RX ORDER — TRANYLCYPROMINE 10 MG/1
TABLET ORAL
Qty: 360 TABLET | Refills: 1 | Status: SHIPPED | OUTPATIENT
Start: 2024-07-22

## 2024-07-25 ENCOUNTER — OFFICE VISIT (OUTPATIENT)
Dept: PSYCHIATRY | Facility: CLINIC | Age: 69
End: 2024-07-25
Payer: MEDICARE

## 2024-07-25 DIAGNOSIS — F33.1 MDD (MAJOR DEPRESSIVE DISORDER), RECURRENT EPISODE, MODERATE (HCC): Primary | ICD-10-CM

## 2024-07-25 DIAGNOSIS — F41.1 GENERALIZED ANXIETY DISORDER: ICD-10-CM

## 2024-07-25 PROCEDURE — 99214 OFFICE O/P EST MOD 30 MIN: CPT | Performed by: PHYSICIAN ASSISTANT

## 2024-07-25 RX ORDER — CLONAZEPAM 0.5 MG/1
TABLET ORAL
Qty: 45 TABLET | Refills: 0 | Status: SHIPPED | OUTPATIENT
Start: 2024-07-25

## 2024-07-25 NOTE — PSYCH
Time start 120  End 145  This note was not shared with the patient due to privacy exception: note includes other individuals      PROGRESS NOTE        [facility]      Name and Date of Birth:  CICI Quezada 69 y.o. 1955    Date of Visit: 07/25/24    SUBJECTIVE:  Christina was seen for follow up MDD, ALECIA and for med management.  She feels as though she has been doing pretty well.  Unfortunately her spouse has been dealing with back issues which has been affecting her.  She feels some anger and frustrated about this because their life is ot what she expected at this point due to her spouse's physical limitations.  She feels as though if her were more active or doing mindful meditation he would be better.  Blood pressure tends to be on low side. Following MAOI diet.  Good self care and exercises regularly/ meditates and gets massages.  Medication list was reviewed and updated.  Outside of concerns with her  she feels as though she has been doing well.  Has good interest motivation.    She denies suicidal ideation, intent or plan at present, has no suicidal ideation, intent or plan at present.    She denies any auditory hallucinations and visual hallucinations, denies any other delusional thinking, denies any delusional thinking.    She denies any side effects from medications  .  HPI ROS Appetite Changes and Sleep: normal appetite, normal sleep    Review Of Systems:      Constitutional Negative   ENT Negative   Cardiovascular Negative   Respiratory Negative   Gastrointestinal Negative   Genitourinary Negative   Musculoskeletal Negative   Integumentary Negative   Neurological Negative   Endocrine Negative   Other Symptoms Negative and None       Laboratory Results: No results found for this or any previous visit.    Substance Abuse History:    Social History     Substance and Sexual Activity   Drug Use No       Family Psychiatric History:     Family History    Problem Relation Age of Onset    Pancreatic cancer Mother     Depression Mother         Recurrent    Depression Father         Recurrent    Other Father         Anxiety       The following portions of the patient's history were reviewed and updated as appropriate: past family history, past medical history, past social history, past surgical history and problem list.    Social History     Socioeconomic History    Marital status: /Civil Union     Spouse name: Not on file    Number of children: Not on file    Years of education: Not on file    Highest education level: Not on file   Occupational History     Comment: Currently works full-time   Tobacco Use    Smoking status: Never    Smokeless tobacco: Never   Substance and Sexual Activity    Alcohol use: No    Drug use: No    Sexual activity: Not on file   Other Topics Concern    Not on file   Social History Narrative    Not on file     Social Determinants of Health     Financial Resource Strain: Low Risk  (12/30/2022)    Received from [facility], [facility]    Overall Financial Resource Strain (CARDIA)     Difficulty of Paying Living Expenses: Not hard at all   Food Insecurity: No Food Insecurity (12/30/2022)    Received from [facility], [facility]    Hunger Vital Sign     Worried About Running Out of Food in the Last Year: Never true     Ran Out of Food in the Last Year: Never true   Transportation Needs: No Transportation Needs (12/30/2022)    Received from [facility], [facility]    PRAPARE - Transportation     Lack of Transportation (Medical): No     Lack of Transportation (Non-Medical): No   Physical Activity: Not on file   Stress: Not on file   Social Connections: Not on file   Intimate Partner Violence: Not At Risk (12/30/2022)    Received from [facility], [facility]    Humiliation, Afraid, Rape, and Kick  questionnaire     Fear of Current or Ex-Partner: No     Emotionally Abused: No     Physically Abused: No     Sexually Abused: No   Housing Stability: Low Risk  (12/30/2022)    Received from [facility], [facility]    Housing Stability Vital Sign     Unable to Pay for Housing in the Last Year: No     Number of Places Lived in the Last Year: 1     Unstable Housing in the Last Year: No     Social History     Social History Narrative    Not on file        Social History       Tobacco History       Smoking Status  Never      Smokeless Tobacco Use  Never              Alcohol History       Alcohol Use Status  No              Drug Use       Drug Use Status  No              Sexual Activity       Sexually Active  Not Asked              Activities of Daily Living    Not Asked                       OBJECTIVE:     Mental Status Evaluation:    Appearance age appropriate, casually dressed   Behavior pleasant, cooperative   Speech normal volume, normal pitch   Mood euthymic   Affect congruent   Thought Processes logical   Associations intact associations   Thought Content normal   Perceptual Disturbances: none   Abnormal Thoughts  Risk Potential Suicidal ideation - None  Homicidal ideation - None  Potential for aggression - No   Orientation oriented to person, place, time/date and situation   Memory recent and remote memory grossly intact   Cosciousness alert and awake   Attention Span attention span and concentration are age appropriate   Intellect Not formally assessed   Insight age appropriate    Judgement good    Muscle Strength and  Gait muscle strength and tone were normal   Language no difficulty naming common objects   Fund of Knowledge displays adequate knowledge of current events   Pain none   Pain Scale 0       Assessment/Plan:       Diagnoses and all orders for this visit:    MDD (major depressive disorder), recurrent episode, moderate (HCC)  -     clonazePAM (KlonoPIN) 0.5 mg tablet;  1/2 po qhs prn    Generalized anxiety disorder  -     clonazePAM (KlonoPIN) 0.5 mg tablet; 1/2 po qhs prn          Treatment Recommendations/Precautions:  Parnate 10 mg 2 poq am and 2 po qhs  Clonazepam 0.5 mg po 1/2- 1 po qhs prn  Aware of MAOI diet and has been following without any issues  No adverse effects noted with clonazepam  Taking a half of a tablet, 0.25 mg as needed  Aware of potential for adverse effects with medications and has been tolerating well  Continue current medications and follow-up in 4 months  Aware of after-hours on-call service and [phone] crisis line  Risks/Benefits      Risks, Benefits And Possible Side Effects Of Medications:    Risks, benefits, and possible side effects of medications explained to patient and patient verbalizes understanding and agreement for treatment.    Controlled Medication Discussion:     PDPM reviewed    Psychotherapy Provided:     Individual psychotherapy provided: No

## 2024-11-21 ENCOUNTER — OFFICE VISIT (OUTPATIENT)
Dept: PSYCHIATRY | Facility: CLINIC | Age: 69
End: 2024-11-21
Payer: MEDICARE

## 2024-11-21 DIAGNOSIS — F41.1 GENERALIZED ANXIETY DISORDER: ICD-10-CM

## 2024-11-21 DIAGNOSIS — F33.1 MDD (MAJOR DEPRESSIVE DISORDER), RECURRENT EPISODE, MODERATE (HCC): Primary | ICD-10-CM

## 2024-11-21 PROCEDURE — 99214 OFFICE O/P EST MOD 30 MIN: CPT | Performed by: PHYSICIAN ASSISTANT

## 2024-11-21 RX ORDER — CLONAZEPAM 0.5 MG/1
TABLET ORAL
Qty: 45 TABLET | Refills: 0 | Status: SHIPPED | OUTPATIENT
Start: 2024-11-21

## 2024-11-21 RX ORDER — TRANYLCYPROMINE 10 MG/1
TABLET ORAL
Qty: 360 TABLET | Refills: 1 | Status: SHIPPED | OUTPATIENT
Start: 2024-11-21
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
## 2024-11-21 NOTE — ASSESSMENT & PLAN NOTE
Continue Parnate and as needed clonazepam  Orders:    clonazePAM (KlonoPIN) 0.5 mg tablet; 1/2 po qhs prn

## 2024-11-21 NOTE — BH TREATMENT PLAN
TREATMENT PLAN (Medication Management Only)        [facility] - [facility]YCHI[facility]RIC [facility]    Name and Date of Birth:  CICI Quezada 69 y.o. 1955  Date of Treatment Plan: November 21, 2024  Diagnosis/Diagnoses:    1. MDD (major depressive disorder), recurrent episode, moderate (HCC)    2. Generalized anxiety disorder      Strengths/Personal Resources for Self-Care: supportive family, supportive friends, taking medications as prescribed.  Area/Areas of need (in own words): anxiety symptoms  1. Long Term Goal: maintain control of anxiety.  Target Date:6 months - 5/21/2025  Person/Persons responsible for completion of goal: CICI Vasquez  2.  Short Term Objective (s) - How will we reach this goal?:   A. Provider new recommended medication/dosage changes and/or continue medication(s): continue current medications as prescribed.  B. N/A.  C. N/A.  Target Date:6 months - 5/21/2025  Person/Persons Responsible for Completion of Goal: CICI Vasquez  Progress Towards Goals: stable  Treatment Modality: medication management every 4 months  Review due 180 days from date of this plan: 6 months - 5/21/2025  Expected length of service: maintenance  My Physician/PA/NP and I have developed this plan together and I agree to work on the goals and objectives. I understand the treatment goals that were developed for my treatment.

## 2025-01-08 ENCOUNTER — TELEPHONE (OUTPATIENT)
Dept: NEUROLOGY | Facility: CLINIC | Age: 70
End: 2025-01-08

## 2025-01-09 ENCOUNTER — APPOINTMENT (OUTPATIENT)
Dept: LAB | Facility: CLINIC | Age: 70
End: 2025-01-09
Payer: MEDICARE

## 2025-01-09 ENCOUNTER — OFFICE VISIT (OUTPATIENT)
Dept: NEUROLOGY | Facility: CLINIC | Age: 70
End: 2025-01-09
Payer: MEDICARE

## 2025-01-09 VITALS
SYSTOLIC BLOOD PRESSURE: 120 MMHG | DIASTOLIC BLOOD PRESSURE: 70 MMHG | HEART RATE: 68 BPM | BODY MASS INDEX: 27.98 KG/M2 | WEIGHT: 163 LBS | OXYGEN SATURATION: 98 %

## 2025-01-09 DIAGNOSIS — G45.4 TGA (TRANSIENT GLOBAL AMNESIA): Primary | ICD-10-CM

## 2025-01-09 DIAGNOSIS — G45.4 TGA (TRANSIENT GLOBAL AMNESIA): ICD-10-CM

## 2025-01-09 DIAGNOSIS — R41.82 ALTERED MENTAL STATUS, UNSPECIFIED: ICD-10-CM

## 2025-01-09 LAB — VIT B12 SERPL-MCNC: 386 PG/ML (ref 180–914)

## 2025-01-09 PROCEDURE — 82607 VITAMIN B-12: CPT

## 2025-01-09 PROCEDURE — 99204 OFFICE O/P NEW MOD 45 MIN: CPT | Performed by: PHYSICIAN ASSISTANT

## 2025-01-09 PROCEDURE — G2211 COMPLEX E/M VISIT ADD ON: HCPCS | Performed by: PHYSICIAN ASSISTANT

## 2025-01-09 PROCEDURE — 36415 COLL VENOUS BLD VENIPUNCTURE: CPT

## 2025-01-09 NOTE — PROGRESS NOTES
"Name: CICI Quezada      : 1955      MRN: [id number]  Encounter Provider: Dylan Hancock PA-C  Encounter Date: 2025   Encounter department: [facility]  :  Assessment & Plan  TGA (transient global amnesia)  Patient with a recent episode of transient memory loss lasting for 1 hour and self resolved for which she was seen at [facility].  She had a similar event back in 2018 diagnosed as transient global amnesia. During the most recent episode she describes not recalling a period of time when at home with her . She denies any associated seizure like activity. ER exam was normal per records. MRI brain was normal as we a recent TSH.      On exam in the office she scored a 28/30 on memory testing.  Although she does notice some difficulty with word finding at times she otherwise denies any cognitive deficits or memory issues interfering with daily function.    History is more consistent with her diagnosis of transient global amnesia.  Given this is her second similar episode however will get a baseline EEG for completeness. Will also check a B12.        Orders:    Vitamin B12; Future    EEG Routine and awake; Future    Altered mental status, unspecified  Will check a baseline EEG and B12.    Orders:    Vitamin B12; Future          History of Present Illness   Christina Quezada is a 69 y/o female with h/o HTN, depression and vertigo who presents as a new patient visit for transient global amnesia.  To review, she presents to the [facility] ED 10/29/24 following an amnesic episode that day which lasted for about 1 hour. Per inpatient notes she was having difficulty recalling names of grandchildren and pets, as well as not remembering things about family members. The day prior she had intermittent dizziness and nausea, but no vomiting. MRI brain performed inpatient, per report, \"no acute hemorrhage, mass or ischemia identified\". TSH normal.     States that she had an episode " "back in October when she had an episode of memory loss  She had an episode of vertigo the day prior   When she woke the next day she went downstairs and her  came and she told him that something was \"off\"  He began to ask her some questions and she was unable to recall that her granddaughter was at college, what her cats name was   She was having a nard time with remembering things in the moment  States that she did forget a portion of that time when her  got her clothes out and dressed   Memory began to improve after 1-2 hours   No increased stress prior to the event   No associated vomiting, nausea, speech changes, vision changes, extremity movements or weakness, sensory changes, loss of bladder or bowel function, tongue biting   No postictal state  Had a similar episode in 2018, presented to the ED and had imaging which was normal   At that time she also felt that there was a period of time that she does not recall, lasted for an hour and then resolved   She does have some normal forgetfulness, will have some word finding issues  Driving without issues   No concerns for memory changes on a daily basis   She does struggle with anxiety and depression which she follows with psychiatry, very stable mood at this time     Review of Systems I have personally reviewed the MA's review of systems and made changes as necessary.         Objective   /70 (BP Location: Left arm, Patient Position: Sitting, Cuff Size: Adult)   Pulse 68   Wt 73.9 kg (163 lb)   SpO2 98%   BMI 27.98 kg/m²     Physical Exam  Constitutional:       Appearance: Normal appearance.   HENT:      Right Ear: Hearing normal.      Left Ear: Hearing normal.   Eyes:      General: Lids are normal.      Extraocular Movements: Extraocular movements intact.      Pupils: Pupils are equal, round, and reactive to light.   Pulmonary:      Effort: Pulmonary effort is normal.   Neurological:      Mental Status: She is alert.      Motor: Motor " strength is normal.      Neurological Exam  Mental Status  Alert. Oriented to person, place and time. Able to copy figure. Clock drawing is normal. Able to name objects and repeat. Able to perform serial calculations.  MoCA 28/30 - 1/9/24.    Cranial Nerves  CN III, IV, VI: Extraocular movements intact bilaterally. Normal lids and orbits bilaterally. Pupils equal round and reactive to light bilaterally.  CN V:  Right: Facial sensation is normal.  Left: Facial sensation is normal on the left.  CN VII:  Right: There is no facial weakness.  Left: There is no facial weakness.  CN VIII:  Right: Hearing is normal.  Left: Hearing is normal.  CN IX, X: Palate elevates symmetrically  CN XI: Shoulder shrug strength is normal.  CN XII: Tongue midline without atrophy or fasciculations.    Motor   Normal muscle tone. Strength is 5/5 throughout all four extremities.    Sensory  Light touch is normal in upper and lower extremities.     Coordination  Right: Finger-to-nose normal.Left: Finger-to-nose normal.    Gait  Casual gait is normal including stance, stride, and arm swing.      Radiology Results Review: I have reviewed radiology reports from [facility] including: MRI brain.    Administrative Statements   I have spent a total time of 55 minutes in caring for this patient on the day of the visit/encounter including Diagnostic results, Prognosis, Risks and benefits of tx options, Instructions for management, Patient and family education, Risk factor reductions, Impressions, Counseling / Coordination of care, Documenting in the medical record, Reviewing / ordering tests, medicine, procedures  , and Obtaining or reviewing history  .

## 2025-01-09 NOTE — ASSESSMENT & PLAN NOTE
Patient with a recent episode of transient memory loss lasting for 1 hour and self resolved for which she was seen at [facility].  She had a similar event back in 2018 diagnosed as transient global amnesia. During the most recent episode she describes not recalling a period of time when at home with her . She denies any associated seizure like activity. ER exam was normal per records. MRI brain was normal as we a recent TSH.      On exam in the office she scored a 28/30 on memory testing.  Although she does notice some difficulty with word finding at times she otherwise denies any cognitive deficits or memory issues interfering with daily function.    History is more consistent with her diagnosis of transient global amnesia.  Given this is her second similar episode however will get a baseline EEG for completeness. Will also check a B12.        Orders:    Vitamin B12; Future    EEG Routine and awake; Future

## 2025-01-30 ENCOUNTER — TELEPHONE (OUTPATIENT)
Age: 70
End: 2025-01-30

## 2025-01-30 NOTE — TELEPHONE ENCOUNTER
Patient is calling regarding cancelling an appointment.    Date/Time: 2/20 @ 2:30pm    Reason: pts  will be undergoing back surgery     Patient was rescheduled: YES [x] NO []  If yes, when was Patient reschedule for: 3/3/25 10am

## 2025-02-17 DIAGNOSIS — F33.1 MDD (MAJOR DEPRESSIVE DISORDER), RECURRENT EPISODE, MODERATE (HCC): ICD-10-CM

## 2025-02-17 DIAGNOSIS — F41.1 GENERALIZED ANXIETY DISORDER: ICD-10-CM

## 2025-02-17 RX ORDER — CLONAZEPAM 0.5 MG/1
TABLET ORAL
Qty: 45 TABLET | Refills: 0 | Status: SHIPPED | OUTPATIENT
Start: 2025-02-17

## 2025-02-17 NOTE — TELEPHONE ENCOUNTER
11/24/2024 11/21/2024 clonazePAM (Tablet) 45.0 90 0.5 MG NA CE [employer] 0 / 0 PA   1 [id number] 08/05/2024 07/25/2024 clonazePAM (Tablet) 45.0 90 0.5 MG NA CE [employer] 0 / 0 PA   1 [id number] 05/09/2024 03/28/2024 clonazePAM (Tablet) 45.0 90 0.5 MG NA CE [facility]

## 2025-03-03 ENCOUNTER — OFFICE VISIT (OUTPATIENT)
Dept: PSYCHIATRY | Facility: CLINIC | Age: 70
End: 2025-03-03
Payer: MEDICARE

## 2025-03-03 DIAGNOSIS — F41.1 GENERALIZED ANXIETY DISORDER: ICD-10-CM

## 2025-03-03 DIAGNOSIS — F33.1 MDD (MAJOR DEPRESSIVE DISORDER), RECURRENT EPISODE, MODERATE (HCC): Primary | ICD-10-CM

## 2025-03-03 PROCEDURE — 99214 OFFICE O/P EST MOD 30 MIN: CPT | Performed by: PHYSICIAN ASSISTANT

## 2025-03-03 NOTE — PSYCH
MEDICATION MANAGEMENT NOTE    Name: CICI Quezada      : 1955      MRN: [id number]  Encounter Provider: Tiana Alonzo PA-C  Encounter Date: 3/3/2025   Encounter department: [facility]    Insurance: Payor: MEDICARE / Plan: MEDICARE A AND B / Product Type: Medicare A & B Fee for Service /      Reason for Visit: No chief complaint on file.  :  Assessment & Plan  MDD (major depressive disorder), recurrent episode, moderate (HCC)  Currently managing with meds  Continue parnate 10 mg 2 po bid       Generalized anxiety disorder  Increased recently  Continue prn clonazepam 0.5 mg 1/2 po qhs prn  Parnate 10 mg 2 po am and 2 po qhs         Follow-up in 4 months and she will call sooner if any questions or concerns  Treatment Recommendations:    Educated about diagnosis and treatment modalities. Verbalizes understanding and agreement with the treatment plan.  Discussed self monitoring of symptoms, and symptom monitoring tools.  Discussed medications and if treatment adjustment was needed or desired.  Aware of 24 hour and weekend coverage for urgent situations accessed by calling [facility] main practice number  I am scheduling this patient out for greater than 3 months: Yes - Patient's stability of symptoms warrant this length of time or no significant changes to treatment plan    Medications Risks/Benefits:      Risks, Benefits And Possible Side Effects Of Medications:    Risks, benefits, and possible side effects of medications explained to Christina and she (or legal representative) verbalizes understanding and agreement for treatment.    Controlled Medication Discussion:     Christina has been filling controlled prescriptions on time as prescribed according to [place] Prescription Drug Monitoring Program      History of Present Illness   Christina states that she has had some increased anxiety and stressors over politics over the past few months.  Acknowledges  level of control she has over many of these concerns.  States that she has been doing things to help herself such as limiting social media.  Also limiting the amount of news that she watches or reads.  Her spouse had spinal surgery and he has been recuperating well.  States that after his surgery she recognized how nice people are otherwise well and how helpful everyone has been.  Good support relationship with her family and her son is doing well.  Physically she is stable.  Med list reviewed and updated.    Review Of Systems: A review of systems is obtained and is negative except for the pertinent positives listed in HPI/Subjective above.      Current Rating Scores:     Current PHQ-9   PHQ-2/9 Depression Screening    Little interest or pleasure in doing things: 0 - not at all  Feeling down, depressed, or hopeless: 0 - not at all  Trouble falling or staying asleep, or sleeping too much: 1 - several days  Feeling tired or having little energy: 1 - several days  Poor appetite or overeatin - not at all  Feeling bad about yourself - or that you are a failure or have let yourself or your family down: 1 - several days  Trouble concentrating on things, such as reading the newspaper or watching television: 0 - not at all  Moving or speaking so slowly that other people could have noticed. Or the opposite - being so fidgety or restless that you have been moving around a lot more than usual: 0 - not at all         Areas of Improvement: reviewed in HPI/Subjective Section and reviewed in Assessment and Plan Section    Past Psychiatric History: (unchanged information from previous note copied and updated)    Traumatic History: (unchanged information from previous note copied and updated)    Past Medical History:   Diagnosis Date    Malignant hypertension     Last Assessed:  17        Past Surgical History:   Procedure Laterality Date    APPENDECTOMY      HYSTERECTOMY       Allergies:   Allergies   Allergen Reactions     Percocet [Oxycodone-Acetaminophen] Rash, Hives and Itching     As per patient       Current Outpatient Medications   Medication Sig Dispense Refill    cholecalciferol (VITAMIN D3) 400 units tablet Take 400 Units by mouth daily      clonazePAM (KlonoPIN) 0.5 mg tablet TAKE ONE-HALF (1/2) TABLET AT BEDTIME AS NEEDED 45 tablet 0    estradiol (ESTRACE) 0.5 MG tablet TAKE 1 TABLET DAILY 90 tablet 1    glucosamine-chondroitin 500-400 MG tablet Take 1 tablet by mouth 3 (three) times a day      Levothyroxine Sodium 75 MCG CAPS Take 1 capsule (75 mcg total) by mouth daily 90 capsule 0    magnesium 30 MG tablet Take 30 mg by mouth 2 (two) times a day      Multiple Vitamins-Minerals (MULTIVITAMIN ADULT PO) Take 1 tablet by mouth daily      Omega 3-6-9 Fatty Acids (OMEGA 3-6-9 PO) Take by mouth      saccharomyces boulardii (FLORASTOR) 250 mg capsule Take 250 mg by mouth 2 (two) times a day      tranylcypromine (PARNATE) 10 mg tablet TAKE 2 TABLETS EVERY MORNING AND 2 TABLETS AT BEDTIME 360 tablet 1    zinc sulfate (ZINCATE) 220 mg capsule Take 220 mg by mouth daily       No current facility-administered medications for this visit.       Substance Abuse History:    Social History     Substance and Sexual Activity   Alcohol Use No     Social History     Substance and Sexual Activity   Drug Use No       Social History:    Social History     Socioeconomic History    Marital status: /Civil Union     Spouse name: Not on file    Number of children: Not on file    Years of education: Not on file    Highest education level: Not on file   Occupational History     Comment: Currently works full-time   Tobacco Use    Smoking status: Never    Smokeless tobacco: Never   Substance and Sexual Activity    Alcohol use: No    Drug use: No    Sexual activity: Not on file   Other Topics Concern    Not on file   Social History Narrative    Not on file     Social Drivers of Health     Financial Resource Strain: Low Risk  (12/30/2022)     [facility]    Overall Financial Resource Strain (CARDIA)     Difficulty of Paying Living Expenses: Not hard at all   Food Insecurity: No Food Insecurity (12/30/2022)    [facility]    Hunger Vital Sign     Worried About Running Out of Food in the Last Year: Never true     Ran Out of Food in the Last Year: Never true   Transportation Needs: No Transportation Needs (12/30/2022)    [facility]    PRAPARE - Transportation     Lack of Transportation (Medical): No     Lack of Transportation (Non-Medical): No   Physical Activity: Not on file   Stress: Not on file   Social Connections: Not on file   Intimate Partner Violence: Not At Risk (12/30/2022)    [facility]    Humiliation, Afraid, Rape, and Kick questionnaire     Fear of Current or Ex-Partner: No     Emotionally Abused: No     Physically Abused: No     Sexually Abused: No   Housing Stability: Low Risk  (12/30/2022)    [facility]    Housing Stability Vital Sign     Unable to Pay for Housing in the Last Year: No     Number of Places Lived in the Last Year: 1     Unstable Housing in the Last Year: No       Family Psychiatric History:     Family History   Problem Relation Age of Onset    Pancreatic cancer Mother     Depression Mother         Recurrent    Depression Father         Recurrent    Other Father         Anxiety       Medical History Reviewed by provider this encounter:          Objective   There were no vitals taken for this visit.     Mental Status Evaluation:    Appearance age appropriate, casually dressed, dressed appropriately   Behavior pleasant, cooperative, calm   Speech normal rate, normal volume, normal pitch, spontaneous, hypertalkative   Mood  anxious   Affect normal range and intensity, appropriate, mood-congruent   Thought Processes circumstantial   Thought Content no overt delusions   Perceptual Disturbances: no auditory hallucinations, no visual hallucinations   Abnormal Thoughts  Risk Potential Suicidal ideation - None  Homicidal ideation - None  Potential for aggression - No   Orientation oriented to person, place, time/date, and situation   Memory recent and remote memory grossly intact   Consciousness alert and awake   Attention Span Concentration Span attention span and concentration are age appropriate   Intellect appears to be of average intelligence   Insight intact   Judgement intact   Muscle Strength and  Gait normal muscle strength and normal muscle tone, normal gait and normal balance   Motor activity no abnormal movements   Language no difficulty naming common objects, no difficulty repeating a phrase, no difficulty writing a sentence   Fund of Knowledge adequate knowledge of current events  adequate fund of knowledge regarding past history  adequate fund of knowledge regarding vocabulary    Pain none   Pain Scale 0       Laboratory Results: I have personally reviewed all pertinent laboratory/tests results    CBC:   Lab Results   Component Value Date    WBC 4.50 09/25/2017    RBC 4.91 09/25/2017    HGB 11.4 (L) 12/31/2022    HCT 33.7 (L) 12/31/2022    MCV 93 09/25/2017     09/25/2017    MCH 29.1 09/25/2017    MCHC 31.5 09/25/2017    RDW 13.8 09/25/2017    MPV 11.6 09/25/2017    NEUTROABS 1.92 09/25/2017     CMP:   Lab Results   Component Value Date    SODIUM 138 10/29/2024    K 3.8 10/29/2024     10/29/2024    CO2 26 10/29/2024    AGAP 7 10/29/2024    BUN 12 10/29/2024    CREATININE 0.85 10/29/2024    GLUC 86 10/29/2024    GLUF 74 09/25/2017    CALCIUM 9.3 10/29/2024    AST 18 10/29/2024    ALT 9 10/29/2024    ALKPHOS 26 (L) 10/29/2024    TP 6.7 10/29/2024    ALB 4.3 10/29/2024    TBILI 0.8 10/29/2024    EGFR 73 10/29/2024        Suicide/Homicide Risk Assessment:    Risk of Harm to Self:  The following ratings are based on assessment at the time of the interview- minimal    Risk of Harm to Others:none  The following ratings are based on assessment at the time of the interview    The following interventions are recommended: Continue medication management. No other intervention changes indicated at this time.    Psychotherapy Provided:     Individual psychotherapy provided: No    Treatment Plan:    Completed and signed during the session: Not applicable - Treatment Plan not due at this session    Goals: Progress towards Treatment Plan goals - Yes, progressing, as evidenced by subjective findings in HPI/Subjective Section and in Assessment and Plan Section    Depression Follow-up Plan Completed: Not applicable    Note Share:    This note was not shared with the patient due to reasonable likelihood of causing patient harm    Administrative Statements   Administrative Statements   I have spent a total time of 30 minutes in caring for this patient on the day of the visit/encounter including Risks and benefits of tx options, Instructions for management, Patient and family education, Importance of tx compliance, Risk factor reductions, Impressions, Counseling / Coordination of care, Documenting in the medical record, Reviewing/placing orders in the medical record (including tests, medications, and/or procedures), and Obtaining or reviewing history  .    Visit Time  Visit Start Time: 1000   Visit Stop Time: 1030   Total Visit Duration:  30 minutes    Tiana Alonzo PA-C 03/03/25

## 2025-03-03 NOTE — ASSESSMENT & PLAN NOTE
Increased recently  Continue prn clonazepam 0.5 mg 1/2 po qhs prn  Parnate 10 mg 2 po am and 2 po qhs

## 2025-03-12 ENCOUNTER — HOSPITAL ENCOUNTER (OUTPATIENT)
Dept: NEUROLOGY | Facility: CLINIC | Age: 70
Discharge: HOME | End: 2025-03-12
Payer: MEDICARE

## 2025-03-12 DIAGNOSIS — G45.4 TGA (TRANSIENT GLOBAL AMNESIA): ICD-10-CM

## 2025-03-12 PROCEDURE — 95816 EEG AWAKE AND DROWSY: CPT | Performed by: PSYCHIATRY & NEUROLOGY

## 2025-03-12 PROCEDURE — 95816 EEG AWAKE AND DROWSY: CPT

## 2025-04-09 ENCOUNTER — TELEPHONE (OUTPATIENT)
Dept: NEUROLOGY | Facility: CLINIC | Age: 70
End: 2025-04-09

## 2025-04-09 ENCOUNTER — TELEPHONE (OUTPATIENT)
Age: 70
End: 2025-04-09

## 2025-04-09 NOTE — TELEPHONE ENCOUNTER
Dylan Hancock PA-C  Neurology Neurovascular Team 47 minutes ago (3:22 PM)       Please let the patient know that her EEG did not show any seizure activity.  Once again there was nothing on her EEG concerning for seizure activity at this time.  Certainly if she were to have another similar event however we may need to do longer monitoring.  At this I would not do anything further.  Thank so much!   _______________________________________________________    Spoke with pt. Made her aware of the results. Pt is currently scheduled for an OV on 10/9/25. She requested to ask if a follow up visit is necessary based on her EEG results.

## 2025-04-09 NOTE — TELEPHONE ENCOUNTER
LMOM to reschedule appointment with Dylan. Dylan will not be in office she will be out on pto. Advised patient to call office back and reschedule appointment.

## 2025-04-09 NOTE — TELEPHONE ENCOUNTER
Patient called and rescheduled 4/10 appt with Dylan 10/9 in [place].    Patient is need for someone in clinical team to please give her a call back in regards to EEG report from 3/12/25.  Patient is very concerned.    Please assist

## 2025-04-09 NOTE — TELEPHONE ENCOUNTER
"Inbound call received from Patient to ask again if she needs to come back for another appointment.     Patient also states her other concern is if she does not go to the appointment will her insurance charge her for not following up?    Patient referred to discuss with her insurance about the billing. Patient does not want to be \"socked with a bill for something ordered\" by our provider and said it is \"difficult to get through.\"     Dylan EARLY: Please advise thank you!    Prior Auth team: do you have any insight how the billing process would be if the patient does not have a follow up appointment? If she would be charged for the EEG? Thank you!  "

## 2025-04-11 NOTE — TELEPHONE ENCOUNTER
04/11/25    Patient called office returning Voice Message left.    Confirmed Message to Patient.    Related Message.    Patient was happy with answer and Expressed her Thanks.      Any questions, please contact Patient.  Thank You.

## 2025-04-11 NOTE — TELEPHONE ENCOUNTER
Ebony Schwartz PA-C to Me     4/10/25  8:40 AM  Dylan is out of the office through all of next week.    I am not sure what the question is.  If the patient completed an EEG, she will of course be charged/billed for the EEG.  A follow up appt has nothing to do with the EEG.  She cannot be billed for something she does not do (such as a follow up appt).    Will need Dylan to advise if she needs to follow up in the office to review her EEG and clinical issues, but that has nothing to do with the EEG billing.  There is no mandated follow up appt attached to billing for an EEG  -------------------------------------------    Outbound call made to Patient but there was no answer. CTS left a voicemail requesting return call. Upon returned call please give provider advisement. Patient was worried if she did not have a follow up appointment the insurance would not pay their part or refuse to cover the EEG that was done.     Team: Please try again to reach patient, thank you!

## 2025-05-01 ENCOUNTER — OFFICE VISIT (OUTPATIENT)
Dept: NEUROLOGY | Facility: CLINIC | Age: 70
End: 2025-05-01
Payer: MEDICARE

## 2025-05-01 VITALS
OXYGEN SATURATION: 98 % | DIASTOLIC BLOOD PRESSURE: 70 MMHG | BODY MASS INDEX: 27.31 KG/M2 | WEIGHT: 160 LBS | SYSTOLIC BLOOD PRESSURE: 110 MMHG | HEIGHT: 64 IN | HEART RATE: 70 BPM

## 2025-05-01 DIAGNOSIS — G45.4 TGA (TRANSIENT GLOBAL AMNESIA): Primary | ICD-10-CM

## 2025-05-01 PROCEDURE — 99213 OFFICE O/P EST LOW 20 MIN: CPT | Performed by: PHYSICIAN ASSISTANT

## 2025-05-01 PROCEDURE — G2211 COMPLEX E/M VISIT ADD ON: HCPCS | Performed by: PHYSICIAN ASSISTANT

## 2025-05-01 NOTE — PROGRESS NOTES
"Name: CICI Quezada      : 1955      MRN: [id number]  Encounter Provider: Dylan Hancock PA-C  Encounter Date: 2025   Encounter department: [facility]  :  Assessment & Plan  TGA (transient global amnesia)  Patient with history of 2 episodes consistent with transient global amnesia.  Last episode was in , no recent episodes.  Workup was overall normal.  She did have an EEG with no epileptiform activity noted.  Brain MRI also with no acute changes.  She feels she is doing overall very well.  She has some slight difficulty with name recall at times however she is still functioning independently.  MoCA at her last visit was a .  At this time I do not feel any further workup is warranted.  She was however instructed to call the office with any recurrence of symptoms and at that time we may need to pursue longer EEG monitoring.    Patient was encouraged to increase mind stimulating activities such as reading, crosswords, word searches, puzzles, Soduku, solitaire, coloring and other brain games.  We also discussed the importance of staying physically active and eating a health diet such as the Mediterranean or MIND diet.                       History of Present Illness   Christina Quezada is a 71 y/o female with h/o HTN, depression and vertigo who presents as a new patient visit for transient global amnesia.  To review, she presents to the [facility] ED 10/29/24 following an amnesic episode that day which lasted for about 1 hour. Per inpatient notes she was having difficulty recalling names of grandchildren and pets, as well as not remembering things about family members. The day prior she had intermittent dizziness and nausea, but no vomiting. MRI brain performed inpatient, per report, \"no acute hemorrhage, mass or ischemia identified\". TSH normal.      She had an episode of transient memory loss in 2024. She had an episode of vertigo the day prior. When she woke the next " "day she went downstairs and her  came and she told him that something was \"off\". He began to ask her some questions and she was unable to recall that her granddaughter was at college, and her cats name. States that she did forget a portion of that time when her  got her clothes out and she got dressed. Memory began to improve after 1-2 hours. No associated vomiting, nausea, speech changes, vision changes, extremity movements or weakness, sensory changes, loss of bladder or bowel function, tongue biting. No postictal state. Had a similar episode in 2018, presented to the ED and had imaging which was normal. At that time she also felt that there was a period of time that she does not recall, lasted for an hour and then resolved.     At her initial visit she scored 28/30 on MoCA.  She was sent for EEG and B12.  EEG revealed slow posterior dominant rhythm which is nonspecific for mild diffuse cerebral dysfunction. NO epileptiform discharges. B12 386.     INTERVAL HISTORY:  She is just getting over an episode of BPPV, she has benefit with the Epley Maneuver   No further amnesia events since October 2024   Overall doing well   She feels that she is doing well   Remains on some medication for mood which she feels helps   She has some trouble with name recall, overall she is functioning very well        Review of Systems I have personally reviewed the MA's review of systems and made changes as necessary.         Objective   /70 (BP Location: Left arm, Patient Position: Sitting, Cuff Size: Adult)   Pulse 70   Ht 5' 4\" (1.626 m)   Wt 72.6 kg (160 lb)   SpO2 98%   BMI 27.46 kg/m²     Physical Exam  Constitutional:       Appearance: Normal appearance.   HENT:      Right Ear: Hearing normal.      Left Ear: Hearing normal.   Eyes:      General: Lids are normal.      Extraocular Movements: Extraocular movements intact.      Pupils: Pupils are equal, round, and reactive to light.   Pulmonary:      Effort: " Pulmonary effort is normal.   Neurological:      Mental Status: She is alert.      Motor: Motor strength is normal.      Neurological Exam  Mental Status  Alert. Oriented to person, place and time. Able to copy figure. Clock drawing is normal. Able to name objects and repeat. Able to perform serial calculations.  MoCA 28/30 - 1/9/24.    Cranial Nerves  CN III, IV, VI: Extraocular movements intact bilaterally. Normal lids and orbits bilaterally. Pupils equal round and reactive to light bilaterally.  CN V:  Right: Facial sensation is normal.  Left: Facial sensation is normal on the left.  CN VII:  Right: There is no facial weakness.  Left: There is no facial weakness.  CN VIII:  Right: Hearing is normal.  Left: Hearing is normal.  CN IX, X: Palate elevates symmetrically  CN XI: Shoulder shrug strength is normal.  CN XII: Tongue midline without atrophy or fasciculations.    Motor   Normal muscle tone. Strength is 5/5 throughout all four extremities.    Sensory  Light touch is normal in upper and lower extremities.     Coordination  Right: Finger-to-nose normal.Left: Finger-to-nose normal.    Gait  Casual gait is normal including stance, stride, and arm swing.      Radiology Results Review: I have reviewed the following images/report studies in PACS: EEG

## 2025-05-01 NOTE — PROGRESS NOTES
"Patient with a recent episode of transient memory loss lasting for 1 hour and self resolved for which she was seen at [facility].  She had a similar event back in 2018 diagnosed as transient global amnesia. During the most recent episode she describes not recalling a period of time when at home with her . She denies any associated seizure like activity. ER exam was normal per records. MRI brain was normal as we a recent TSH.       On exam in the office she scored a 28/30 on memory testing.  Although she does notice some difficulty with word finding at times she otherwise denies any cognitive deficits or memory issues interfering with daily function.     History is more consistent with her diagnosis of transient global amnesia.  Given this is her second similar episode however will get a baseline EEG for completeness. Will also check a B12.            Christina Quezada is a 71 y/o female with h/o HTN, depression and vertigo who presents as a new patient visit for transient global amnesia.  To review, she presents to the [facility] ED 10/29/24 following an amnesic episode that day which lasted for about 1 hour. Per inpatient notes she was having difficulty recalling names of grandchildren and pets, as well as not remembering things about family members. The day prior she had intermittent dizziness and nausea, but no vomiting. MRI brain performed inpatient, per report, \"no acute hemorrhage, mass or ischemia identified\". TSH normal.      She had an episode of transient memory loss in October 2024. She had an episode of vertigo the day prior. When she woke the next day she went downstairs and her  came and she told him that something was \"off\". He began to ask her some questions and she was unable to recall that her granddaughter was at college, and her cats name. States that she did forget a portion of that time when her  got her clothes out and she got dressed. Memory began to improve after 1-2 " hours. No associated vomiting, nausea, speech changes, vision changes, extremity movements or weakness, sensory changes, loss of bladder or bowel function, tongue biting. No postictal state. Had a similar episode in 2018, presented to the ED and had imaging which was normal. At that time she also felt that there was a period of time that she does not recall, lasted for an hour and then resolved.     At her initial visit she scored 28/30 on MoCA.  She was sent for EEG and B12.  EEG revealed slow posterior dominant rhythm which is nonspecific for mild diffuse cerebral dysfunction. NO epileptiform discharges. B12 386.     INTERVAL HISTORY:

## 2025-05-01 NOTE — ASSESSMENT & PLAN NOTE
Patient with history of 2 episodes consistent with transient global amnesia.  Last episode was in 2024, no recent episodes.  Workup was overall normal.  She did have an EEG with no epileptiform activity noted.  Brain MRI also with no acute changes.  She feels she is doing overall very well.  She has some slight difficulty with name recall at times however she is still functioning independently.  MoCA at her last visit was a 28/30.  At this time I do not feel any further workup is warranted.  She was however instructed to call the office with any recurrence of symptoms and at that time we may need to pursue longer EEG monitoring.    Patient was encouraged to increase mind stimulating activities such as reading, crosswords, word searches, puzzles, Soduku, solitaire, coloring and other brain games.  We also discussed the importance of staying physically active and eating a health diet such as the Mediterranean or MIND diet.

## 2025-06-23 DIAGNOSIS — F33.1 MDD (MAJOR DEPRESSIVE DISORDER), RECURRENT EPISODE, MODERATE (HCC): ICD-10-CM

## 2025-06-24 RX ORDER — TRANYLCYPROMINE 10 MG/1
TABLET ORAL
Qty: 360 TABLET | Refills: 1 | Status: SHIPPED | OUTPATIENT
Start: 2025-06-24

## 2025-07-03 ENCOUNTER — OFFICE VISIT (OUTPATIENT)
Dept: PSYCHIATRY | Facility: CLINIC | Age: 70
End: 2025-07-03
Payer: MEDICARE

## 2025-07-03 DIAGNOSIS — F33.1 MDD (MAJOR DEPRESSIVE DISORDER), RECURRENT EPISODE, MODERATE (HCC): Primary | ICD-10-CM

## 2025-07-03 DIAGNOSIS — F51.01 PRIMARY INSOMNIA: ICD-10-CM

## 2025-07-03 DIAGNOSIS — F41.1 GENERALIZED ANXIETY DISORDER: ICD-10-CM

## 2025-07-03 PROCEDURE — 99214 OFFICE O/P EST MOD 30 MIN: CPT | Performed by: PHYSICIAN ASSISTANT

## 2025-07-03 RX ORDER — CLONAZEPAM 0.5 MG/1
TABLET ORAL
Qty: 45 TABLET | Refills: 0 | Status: SHIPPED | OUTPATIENT
Start: 2025-07-03

## 2025-07-03 NOTE — ASSESSMENT & PLAN NOTE
Increased recently  Continue prn clonazepam 0.5 mg 1/2 po qhs prn  Parnate 10 mg 2 po am and 2 po qhs    Orders:    clonazePAM (KlonoPIN) 0.5 mg tablet; TAKE ONE-HALF (1/2) TABLET AT BEDTIME AS NEEDED

## 2025-07-03 NOTE — PSYCH
MEDICATION MANAGEMENT NOTE    Name: CICI Quezada      : 1955      MRN: [id number]  Encounter Provider: Tiana Alonzo PA-C  Encounter Date: 7/3/2025   Encounter department: [facility]    Insurance: Payor: MEDICARE / Plan: MEDICARE A AND B / Product Type: Medicare A & B Fee for Service /      Reason for Visit:   Chief Complaint   Patient presents with    Follow-up    Medication Management   :  Assessment & Plan  MDD (major depressive disorder), recurrent episode, moderate (HCC)  Currently manageable  Continue Parnate 10 mg 2 tabs twice daily  Orders:    clonazePAM (KlonoPIN) 0.5 mg tablet; TAKE ONE-HALF (1/2) TABLET AT BEDTIME AS NEEDED    Generalized anxiety disorder  Increased recently  Continue prn clonazepam 0.5 mg 1/2 po qhs prn  Parnate 10 mg 2 po am and 2 po qhs    Orders:    clonazePAM (KlonoPIN) 0.5 mg tablet; TAKE ONE-HALF (1/2) TABLET AT BEDTIME AS NEEDED     Primary insomnia  Better  Using CPAP       Follow up 4 months    Treatment Recommendations:    Educated about diagnosis and treatment modalities. Verbalizes understanding and agreement with the treatment plan.  Discussed self monitoring of symptoms, and symptom monitoring tools.  Discussed medications and if treatment adjustment was needed or desired.  Aware of 24 hour and weekend coverage for urgent situations accessed by calling [facility] main practice number  I am scheduling this patient out for greater than 3 months: No    Medications Risks/Benefits:      Risks, Benefits And Possible Side Effects Of Medications:    Risks, benefits, and possible side effects of medications explained to Christina and she (or legal representative) verbalizes understanding and agreement for treatment.    Controlled Medication Discussion:     Christina has been filling controlled prescriptions on time as prescribed according to [place] Prescription Drug Monitoring Program.      History of Present  Dia Vasquez was seen for follow-up and for medication management.  States that overall she feels as though she has been managing.  She is taking medication as prescribed.  Continues with residual anxieties but states that she has been trying to rationalize things that she has control over and recognize what she has no control over.  Worried about political issues that are ongoing and her children and grandchildren.  Supportive psychotherapy provided.  States that she has been doing yoga which has been very helpful.  Also watching nutrition.  Physically she is doing well.  Med list reviewed and updated.      Review Of Systems: A review of systems is obtained and is negative except for the pertinent positives listed in HPI/Subjective above.      Current Rating Scores:     Current PHQ-9   PHQ-2/9 Depression Screening    Little interest or pleasure in doing things: 0 - not at all  Feeling down, depressed, or hopeless: 0 - not at all  Trouble falling or staying asleep, or sleeping too much: 1 - several days  Feeling tired or having little energy: 1 - several days  Poor appetite or overeatin - several days  Feeling bad about yourself - or that you are a failure or have let yourself or your family down: 0 - not at all  Trouble concentrating on things, such as reading the newspaper or watching television: 0 - not at all  Moving or speaking so slowly that other people could have noticed. Or the opposite - being so fidgety or restless that you have been moving around a lot more than usual: 0 - not at all  Thoughts that you would be better off dead, or of hurting yourself in some way: 0 - not at all  PHQ-9 Score: 3  PHQ-9 Interpretation: No or Minimal depression         Areas of Improvement: reviewed in HPI/Subjective Section and reviewed in Assessment and Plan Section      Past Medical History[1]  Past Surgical History[2]  Allergies: Allergies[3]    Current Outpatient Medications   Medication Instructions     cholecalciferol (VITAMIN D3) 400 Units, Daily    clonazePAM (KlonoPIN) 0.5 mg tablet TAKE ONE-HALF (1/2) TABLET AT BEDTIME AS NEEDED    estradiol (ESTRACE) 0.5 MG tablet TAKE 1 TABLET DAILY    glucosamine-chondroitin 500-400 MG tablet 1 tablet, 3 times daily    Levothyroxine Sodium 75 mcg, Oral, Daily    magnesium 30 mg, 2 times daily    Multiple Vitamins-Minerals (MULTIVITAMIN ADULT PO) 1 tablet, Daily    Omega 3-6-9 Fatty Acids (OMEGA 3-6-9 PO) Take by mouth    saccharomyces boulardii (FLORASTOR) 250 mg, 2 times daily    tranylcypromine (PARNATE) 10 mg tablet TAKE 2 TABLETS EVERY MORNING AND 2 TABLETS AT BEDTIME    zinc sulfate (ZINCATE) 220 mg, Daily        Substance Abuse History:    Tobacco, Alcohol and Drug Use History     Tobacco Use    Smoking status: Never    Smokeless tobacco: Never   Substance Use Topics    Alcohol use: No    Drug use: No          Social History:    Social History     Socioeconomic History    Marital status: /Civil Union     Spouse name: Not on file    Number of children: Not on file    Years of education: Not on file    Highest education level: Not on file   Occupational History     Comment: Currently works full-time   Other Topics Concern    Not on file   Social History Narrative    Not on file        Family Psychiatric History:     Family History[4]    Medical History Reviewed by provider this encounter:  Meds  Problems          Objective   There were no vitals taken for this visit.     Mental Status Evaluation:    Appearance age appropriate, casually dressed, dressed appropriately   Behavior cooperative, calm   Speech normal rate, normal volume, normal pitch, spontaneous, hypertalkative   Mood euthymic   Affect normal range and intensity, appropriate   Thought Processes circumstantial   Thought Content no overt delusions   Perceptual Disturbances: no auditory hallucinations, no visual hallucinations   Abnormal Thoughts  Risk Potential Suicidal ideation - None  Homicidal  ideation - None  Potential for aggression - No   Orientation oriented to person, place, time/date, and situation   Memory recent and remote memory grossly intact   Consciousness alert and awake   Attention Span Concentration Span attention span and concentration are age appropriate   Intellect appears to be of average intelligence   Insight intact   Judgement intact   Muscle Strength and  Gait normal muscle strength and normal muscle tone, normal gait and normal balance   Motor activity no abnormal movements   Language no difficulty naming common objects, no difficulty repeating a phrase, no difficulty writing a sentence   Fund of Knowledge adequate knowledge of current events  adequate fund of knowledge regarding past history  adequate fund of knowledge regarding vocabulary        Laboratory Results: I have personally reviewed all pertinent laboratory/tests results    CBC:   Lab Results   Component Value Date    WBC 4.50 09/25/2017    RBC 4.91 09/25/2017    HGB 11.4 (L) 12/31/2022    HCT 33.7 (L) 12/31/2022    MCV 93 09/25/2017     09/25/2017    MCH 29.1 09/25/2017    MCHC 31.5 09/25/2017    RDW 13.8 09/25/2017    MPV 11.6 09/25/2017    NEUTROABS 1.92 09/25/2017     CMP:   Lab Results   Component Value Date    SODIUM 138 10/29/2024    K 3.8 10/29/2024     10/29/2024    CO2 26 10/29/2024    AGAP 7 10/29/2024    BUN 12 10/29/2024    CREATININE 0.85 10/29/2024    GLUC 86 10/29/2024    GLUF 74 09/25/2017    CALCIUM 9.3 10/29/2024    AST 18 10/29/2024    ALT 9 10/29/2024    ALKPHOS 26 (L) 10/29/2024    TP 6.7 10/29/2024    ALB 4.3 10/29/2024    TBILI 0.8 10/29/2024    EGFR 73 10/29/2024       Suicide/Homicide Risk Assessment:    Risk of Harm to Self:  The following ratings are based on assessment at the time of the interview  Based on today's assessment, Christina presents the following risk of harm to self: none    Risk of Harm to Others:  The following ratings are based on assessment at the time of the  "interview  Based on today's assessment, Christina presents the following risk of harm to others: none    The following interventions are recommended: Continue medication management. No other intervention changes indicated at this time.    Psychotherapy Provided:     Individual psychotherapy provided: No    Treatment Plan:    Completed and signed during the session: Yes - with Christina.    Goals: Progress towards Treatment Plan goals - Yes, progressing, as evidenced by subjective findings in HPI/Subjective Section and in Assessment and Plan Section    Depression Follow-up Plan Completed: Not applicable    Note Share:    This note was not shared with the patient due to reasonable likelihood of causing patient harm    Administrative Statements   Administrative Statements   I have spent a total time of 30 minutes in caring for this patient on the day of the visit/encounter including Diagnostic results, Risks and benefits of tx options, Instructions for management, Patient and family education, Importance of tx compliance, Risk factor reductions, Impressions, Counseling / Coordination of care, Documenting in the medical record, Reviewing/placing orders in the medical record (including tests, medications, and/or procedures), and Obtaining or reviewing history  .    Visit Time  Visit Start Time: 1100  Visit Stop Time: 1130  Total Visit Duration: 30 minutes    Portions of the record may have been created with voice recognition software. Occasional wrong word or \"sound a like\" substitutions may have occurred due to the inherent limitations of voice recognition software. Read the chart carefully and recognize, using context, where substitutions have occurred.    Tiana Alonzo PA-C 07/03/25         [1]   Past Medical History:  Diagnosis Date    Malignant hypertension     Last Assessed:  9/25/17   [2]   Past Surgical History:  Procedure Laterality Date    APPENDECTOMY  1990    HYSTERECTOMY     [3]   Allergies  Allergen Reactions "    Percocet [Oxycodone-Acetaminophen] Rash, Hives and Itching     As per patient   [4]   Family History  Problem Relation Name Age of Onset    Pancreatic cancer Mother      Depression Mother          Recurrent    Depression Father          Recurrent    Other Father          Anxiety

## 2025-07-03 NOTE — BH TREATMENT PLAN
"TREATMENT PLAN (Medication Management Only)        [facility]    Name and Date of Birth:  CICI Quezada 70 y.o. 1955  MRN: [id number]  Date of Treatment Plan: July 3, 2025  Diagnosis/Diagnoses:    1. MDD (major depressive disorder), recurrent episode, moderate (HCC)    2. Generalized anxiety disorder    3. Primary insomnia      Strengths/Personal Resources for Self-Care: \"yoga, resilience, hope, spouse, friends, womens group, willingness to learn\".  Area/Areas of need (in own words): \"focusing more on what I have control of\"  1. Long Term Goal:   maintain acceptable anxiety level.  Target Date:6 months - 1/3/2026  Person/Persons responsible for completion of goal: CICI Vasquez  2.  Short Term Objective (s) - How will we reach this goal?:   A.  Provider new recommended medication/dosage changes and/or continue medication(s): continue current medications as prescribed.  B.  N/A.  C.  N/A.  Target Date:6 months - 1/3/2026  Person/Persons Responsible for Completion of Goal: CICI Vasquez  Progress Towards Goals: continuing treatment  Treatment Modality: medication management every 4 months  Review due 180 days from date of this plan: 6 months - 1/3/2026  Expected length of service: maintenance unless revised  My Physician/PA/NP and I have developed this plan together and I agree to work on the goals and objectives. I understand the treatment goals that were developed for my treatment.   Electronic Signatures: on file (unless signed below)    Tiana Alonzo PA-C 07/03/25  "
0 = independent